# Patient Record
Sex: MALE | Race: WHITE | Employment: OTHER | URBAN - METROPOLITAN AREA
[De-identification: names, ages, dates, MRNs, and addresses within clinical notes are randomized per-mention and may not be internally consistent; named-entity substitution may affect disease eponyms.]

---

## 2020-01-09 NOTE — H&P (VIEW-ONLY)
Katy Artis 1935 ASSESSMENT: 
1. Left 5cm partial staghorn stone with intermittent left flank pain 2 Gross hematuria 3. S/p IVC placement 4. 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 Diabetes 6. Prior right renal procedure Proceed to L PCNL. Stop Eliquis 3 days prior. Will need to hold 7 days post op. Has IVC filter in place. PLAN: 
1. Continue with plan for PCNL 1/27/2020 
2. Will need to stop Eliquis 3 days prior and hold for 7 days. 3. Urine today sent for culture DISCUSSION: 
We discussed the indications and risks/benefits of PCNL including infection, bleeding, blood transfusion, post operative pain, injury to surrounding structures including bowel/liver/spleen/lung requiring open repair, injury to kidney requiring prolonged drainage and/or surgical repair. We discussed possible need for multiple procedures to clear stone burden. We also discussed global anesthesia and perioperative risks including stroke, heart attack, DVT, PE, and death. Also expressed need for an overnight stay at the hospital. The patient expresses understanding. Patient's BMI is out of the normal parameters. Information about BMI was given and patient was advised to follow-up with their PCP for further management. Chief Complaint Patient presents with  Kidney Stone Pt is here for Pre-Op consult. Pt denies any problems urinating such as burning, retention, weak streams. Pt appears to be in normal condition. HISTORY OF PRESENT ILLNESS: Katy Artis is a 81 y.o. male who presents today for follow up. Prev followed by Dr. Rhonda Dyer for a left partial staghorn stone. Reports today pre-op for PCNL 1/27/2020. No gross hematuria since his last visit. Previously reporting gross hematuria over the past 4 years and intermittent left flank pain especially when laying down flat over the past year.  Left flank pain has persisted since his last visit. Today he reports a good FOS with sensation of complete bladder emptying. No abdominal pain, f/c/n/v. No dysuria or urinary frequency. S/p negative TRUS bx in 2006. Right renal procedure in the past, unsure what the procedure was. REVIEW OF LABS & IMAGING: 
No results found for: PSA, Tika Arora, PSAR3, [id number], [id number], PSALT  
 
CT Urogram 12/10/2019: I personally reviewed the images. Left partial staghorn stone 1250 Hounsfield units Result Impression 1. Bilateral nonobstructing nephrolithiasis. Largest stone is seen at the level of the left renal pelvis extending into the mid and lower pole calyces, consistent with an evolving staghorn calculus. 2. Pancolonic diverticulosis. 3. Findings may suggest a diarrheal state. 4. Bibasilar volume loss and parenchymal fibrosis. 5. Prostatic hypertrophy. AUA Symptom Score 1/9/2020 Over the past month how often have you had the sensation that your bladder was not completely empty after you finished urinating? 0 Over the past month, how often have had to urinate again less than 2 hours after you last finished urinating? 0 Over the past month, how often have you found you stopped and started again several times when you urinated? 2 Over the past month, how often have you found it difficult to postpone urination? 0 Over the past month, how often have you had a weak urinary stream? 2 Over the past month, how often have you had to push or strain to begin urinating? 0 Over the past month, how many times did you most typically get up to urinate from the time you went to bed at night until the time you got up in the morning? 1  
AUA Score 5 If you were to spend the rest of your life with your urinary condition the way it is now, how would you feel about that? Mostly satisfied Review of Systems Constitutional: Fever: No 
Skin: Rash: No 
HEENT: Hearing difficulty: No 
Eyes: Blurred vision: No 
 Cardiovascular: Chest pain: No 
Respiratory: Shortness of breath: No 
Gastrointestinal: Nausea/vomiting: No 
Musculoskeletal: Back pain: No 
Neurological: Weakness: No 
Psychological: Memory loss: No 
Comments/additional findings:  
 
 
 
Past Medical History:  
Diagnosis Date  Anticoagulated  Basal cell carcinoma 2012 Left ear  Diabetes type 2, controlled ([facility] [address]  Hematuria 2011  
 worked up  Hyperlipidemia  Hypertension  Prostate enlargement 2005  Protein C deficiency ([facility] [address]  Thrombophlebitis of left leg ([facility]) Past Surgical History:  
Procedure Laterality Date  HX KNEE ARTHROSCOPY    
 left 3x  HX OTHER SURGICAL    
 ankle surgery-right  HX SHOULDER ARTHROSCOPY    
 left  HX UROLOGICAL  2006  
 prostate biopsy Allergies Allergen Reactions  Contrast Agent [Iodine] Rash  Penicillins Itching Current Outpatient Medications Medication Sig  
 apixaban (ELIQUIS) 5 mg tablet Take 0.5 Tabs by mouth.  lisinopril (PRINIVIL, ZESTRIL) 10 mg tablet Take 1 Tab by mouth.  metFORMIN (GLUCOPHAGE) 500 mg tablet Take 1 Tab by mouth.  simvastatin (ZOCOR) 20 mg tablet Take 1 Tab by mouth.  empagliflozin (JARDIANCE) 10 mg tablet Take  by mouth daily. No current facility-administered medications for this visit. Family History Problem Relation Age of Onset  Hypertension Mother  Other Mother   
     sudden death ; smoker  Hypertension Father  Elevated Lipids Father  Other Father   
     smoker  Heart Disease Sister   
     myocardial infarction  COPD Sister  Stomach Cancer Brother  Other Brother   
     peptic ulcer Social History Socioeconomic History  Marital status:  Spouse name: Not on file  Number of children: Not on file  Years of education: Not on file  Highest education level: Not on file Tobacco Use  Smoking status: Never Smoker  Smokeless tobacco: Never Used Substance and Sexual Activity  Alcohol use: Not Currently PHYSICAL EXAMINATION:  
Visit Vitals /64 Ht 5' 8\" (1.727 m) Wt 183 lb (83 kg) BMI 27.83 kg/m² Constitutional: WDWN, Pleasant and appropriate affect, No acute distress. CV:  No peripheral swelling noted Respiratory: No respiratory distress or difficulties Abdomen:  No abdominal masses or tenderness. No CVA tenderness. No inguinal hernias noted.  Male 12/18/19:   
Meriel Drought normal to visual inspection, no erythema or irritation, Sphincter with good tone, Rectum with a small external hemorrhoid at the 6 o'clock position. No fissures or masses, Prostate smooth, symmetric and anodular. Prostate is small. SCROTUM:  No scrotal rash or lesions noticed. Normal bilateral testes and epididymis. PENIS: Uncircumcised. Urethral meatus normal in location and size. No urethral discharge. Skin: No jaundice. Neuro/Psych:  Alert and oriented x 3, affect appropriate. Lymphatic:   No enlarged inguinal lymph nodes. LABS TODAY: 
Recent Results (from the past 12 hour(s)) AMB POC URINALYSIS DIP STICK AUTO W/O MICRO Collection Time: 01/09/20  9:15 AM  
Result Value Ref Range Color (UA POC) Yellow Clarity (UA POC) Cloudy Glucose (UA POC) Negative Negative Bilirubin (UA POC) Negative Negative Ketones (UA POC) Negative Negative Specific gravity (UA POC) 1.020 1.001 - 1.035 Blood (UA POC) 4+ Negative pH (UA POC) 5.0 4.6 - 8.0 Protein (UA POC) 3+ Negative Urobilinogen (UA POC) 0.2 mg/dL 0.2 - 1 Nitrites (UA POC) Negative Negative Leukocyte esterase (UA POC) Negative Negative Rajeev Singer MD 
[facility] [phone] Encounter Diagnoses ICD-10-CM ICD-9-CM 1. Staghorn kidney stones N20.0 592.0  
2. Microscopic hematuria R31.29 599.72 3. Left flank pain R10.9 789.09 Medical documentation provided with the assistance of Laxmi Arevalo.  Ramonita Soliman medical scribe for Melody Carrington MD on 1/9/2020.

## 2020-01-13 ENCOUNTER — HOSPITAL ENCOUNTER (OUTPATIENT)
Dept: PREADMISSION TESTING | Age: 85
Discharge: HOME | End: 2020-01-13
Payer: MEDICARE

## 2020-01-13 DIAGNOSIS — Z01.818 PRE-OP EXAM: ICD-10-CM

## 2020-01-13 LAB
ABO + RH BLD: NORMAL
ANION GAP SERPL CALC-SCNC: 8 MMOL/L (ref 3–18)
APPEARANCE UR: CLEAR
APTT PPP: 45 SEC (ref 23–36.4)
ATRIAL RATE: 78 BPM
BACTERIA URNS QL MICRO: ABNORMAL /HPF
BILIRUB UR QL: NEGATIVE
BLOOD GROUP ANTIBODIES SERPL: NORMAL
BUN SERPL-MCNC: 17 MG/DL (ref 7–18)
BUN/CREAT SERPL: 16 (ref 12–20)
CALCIUM SERPL-MCNC: 9.1 MG/DL (ref 8.5–10.1)
CALCULATED P AXIS, ECG09: -5 DEGREES
CALCULATED R AXIS, ECG10: -5 DEGREES
CALCULATED T AXIS, ECG11: 18 DEGREES
CHLORIDE SERPL-SCNC: 110 MMOL/L (ref 100–111)
CO2 SERPL-SCNC: 27 MMOL/L (ref 21–32)
COLOR UR: YELLOW
CREAT SERPL-MCNC: 1.08 MG/DL (ref 0.6–1.3)
DIAGNOSIS, 93000: NORMAL
EPITH CASTS URNS QL MICRO: ABNORMAL /LPF (ref 0–5)
ERYTHROCYTE [DISTWIDTH] IN BLOOD BY AUTOMATED COUNT: 13.3 % (ref 11.6–14.5)
GLUCOSE SERPL-MCNC: 104 MG/DL (ref 74–99)
GLUCOSE UR STRIP.AUTO-MCNC: >1000 MG/DL
HCT VFR BLD AUTO: 47 % (ref 36–48)
HGB BLD-MCNC: 15.8 G/DL (ref 13–16)
HGB UR QL STRIP: ABNORMAL
INR PPP: 1.1 (ref 0.8–1.2)
KETONES UR QL STRIP.AUTO: NEGATIVE MG/DL
LEUKOCYTE ESTERASE UR QL STRIP.AUTO: ABNORMAL
MCH RBC QN AUTO: 30.9 PG (ref 24–34)
MCHC RBC AUTO-ENTMCNC: 33.6 G/DL (ref 31–37)
MCV RBC AUTO: 92 FL (ref 74–97)
MUCOUS THREADS URNS QL MICRO: ABNORMAL /LPF
NITRITE UR QL STRIP.AUTO: NEGATIVE
P-R INTERVAL, ECG05: 174 MS
PH UR STRIP: 5 [PH] (ref 5–8)
PLATELET # BLD AUTO: 149 K/UL (ref 135–420)
PMV BLD AUTO: 11.1 FL (ref 9.2–11.8)
POTASSIUM SERPL-SCNC: 4.2 MMOL/L (ref 3.5–5.5)
PROT UR STRIP-MCNC: 30 MG/DL
PROTHROMBIN TIME: 13.8 SEC (ref 11.5–15.2)
Q-T INTERVAL, ECG07: 380 MS
QRS DURATION, ECG06: 84 MS
QTC CALCULATION (BEZET), ECG08: 433 MS
RBC # BLD AUTO: 5.11 M/UL (ref 4.7–5.5)
RBC #/AREA URNS HPF: ABNORMAL /HPF (ref 0–5)
SODIUM SERPL-SCNC: 145 MMOL/L (ref 136–145)
SP GR UR REFRACTOMETRY: 1.02 (ref 1–1.03)
SPECIMEN EXP DATE BLD: NORMAL
UROBILINOGEN UR QL STRIP.AUTO: 0.2 EU/DL (ref 0.2–1)
VENTRICULAR RATE, ECG03: 78 BPM
WBC # BLD AUTO: 6.4 K/UL (ref 4.6–13.2)
WBC URNS QL MICRO: ABNORMAL /HPF (ref 0–4)

## 2020-01-13 PROCEDURE — 80048 BASIC METABOLIC PNL TOTAL CA: CPT

## 2020-01-13 PROCEDURE — 85610 PROTHROMBIN TIME: CPT

## 2020-01-13 PROCEDURE — 81001 URINALYSIS AUTO W/SCOPE: CPT

## 2020-01-13 PROCEDURE — 93005 ELECTROCARDIOGRAM TRACING: CPT

## 2020-01-13 PROCEDURE — 85730 THROMBOPLASTIN TIME PARTIAL: CPT

## 2020-01-13 PROCEDURE — 85027 COMPLETE CBC AUTOMATED: CPT

## 2020-01-13 PROCEDURE — 36415 COLL VENOUS BLD VENIPUNCTURE: CPT

## 2020-01-13 PROCEDURE — 86900 BLOOD TYPING SEROLOGIC ABO: CPT

## 2020-01-13 PROCEDURE — 87086 URINE CULTURE/COLONY COUNT: CPT

## 2020-01-14 LAB
BACTERIA SPEC CULT: NORMAL
SERVICE CMNT-IMP: NORMAL

## 2020-01-25 ENCOUNTER — ANESTHESIA EVENT (OUTPATIENT)
Dept: SURGERY | Age: 85
DRG: 660 | End: 2020-01-25
Payer: MEDICARE

## 2020-01-27 ENCOUNTER — APPOINTMENT (OUTPATIENT)
Dept: GENERAL RADIOLOGY | Age: 85
DRG: 660 | End: 2020-01-27
Attending: UROLOGY
Payer: MEDICARE

## 2020-01-27 ENCOUNTER — HOSPITAL ENCOUNTER (INPATIENT)
Age: 85
LOS: 1 days | Discharge: HOME | DRG: 660 | End: 2020-01-28
Attending: UROLOGY | Admitting: UROLOGY
Payer: MEDICARE

## 2020-01-27 ENCOUNTER — ANESTHESIA (OUTPATIENT)
Dept: SURGERY | Age: 85
DRG: 660 | End: 2020-01-27
Payer: MEDICARE

## 2020-01-27 DIAGNOSIS — N20.0 KIDNEY STONE ON LEFT SIDE: Primary | ICD-10-CM

## 2020-01-27 LAB
GLUCOSE BLD STRIP.AUTO-MCNC: 110 MG/DL (ref 70–110)
GLUCOSE BLD STRIP.AUTO-MCNC: 137 MG/DL (ref 70–110)
GLUCOSE BLD STRIP.AUTO-MCNC: 151 MG/DL (ref 70–110)
GLUCOSE BLD STRIP.AUTO-MCNC: 163 MG/DL (ref 70–110)

## 2020-01-27 PROCEDURE — 77030008683 HC TU ET CUF COVD -A: Performed by: ANESTHESIOLOGY

## 2020-01-27 PROCEDURE — 77030019927 HC TBNG IRR CYSTO BAXT -A: Performed by: UROLOGY

## 2020-01-27 PROCEDURE — 65270000029 HC RM PRIVATE

## 2020-01-27 PROCEDURE — 77030038846 HC SCPE URETSCP LITHOVUE DISP BSC -H: Performed by: UROLOGY

## 2020-01-27 PROCEDURE — 74011250636 HC RX REV CODE- 250/636: Performed by: UROLOGY

## 2020-01-27 PROCEDURE — 74011250636 HC RX REV CODE- 250/636: Performed by: NURSE ANESTHETIST, CERTIFIED REGISTERED

## 2020-01-27 PROCEDURE — 77030019908 HC STETH ESOPH SIMS -A: Performed by: ANESTHESIOLOGY

## 2020-01-27 PROCEDURE — C1758 CATHETER, URETERAL: HCPCS | Performed by: UROLOGY

## 2020-01-27 PROCEDURE — 74011250637 HC RX REV CODE- 250/637: Performed by: UROLOGY

## 2020-01-27 PROCEDURE — 74011000250 HC RX REV CODE- 250: Performed by: NURSE ANESTHETIST, CERTIFIED REGISTERED

## 2020-01-27 PROCEDURE — C1894 INTRO/SHEATH, NON-LASER: HCPCS | Performed by: UROLOGY

## 2020-01-27 PROCEDURE — 76010000171 HC OR TIME 2 TO 2.5 HR INTENSV-TIER 1: Performed by: UROLOGY

## 2020-01-27 PROCEDURE — 74011636637 HC RX REV CODE- 636/637: Performed by: NURSE ANESTHETIST, CERTIFIED REGISTERED

## 2020-01-27 PROCEDURE — 82360 CALCULUS ASSAY QUANT: CPT

## 2020-01-27 PROCEDURE — 77030026438 HC STYL ET INTUB CARD -A: Performed by: ANESTHESIOLOGY

## 2020-01-27 PROCEDURE — 74011250637 HC RX REV CODE- 250/637: Performed by: NURSE ANESTHETIST, CERTIFIED REGISTERED

## 2020-01-27 PROCEDURE — 77030018836 HC SOL IRR NACL ICUM -A: Performed by: UROLOGY

## 2020-01-27 PROCEDURE — 0T778DZ DILATION OF LEFT URETER WITH INTRALUMINAL DEVICE, VIA NATURAL OR ARTIFICIAL OPENING ENDOSCOPIC: ICD-10-PCS | Performed by: UROLOGY

## 2020-01-27 PROCEDURE — 82962 GLUCOSE BLOOD TEST: CPT

## 2020-01-27 PROCEDURE — C2617 STENT, NON-COR, TEM W/O DEL: HCPCS | Performed by: UROLOGY

## 2020-01-27 PROCEDURE — 77030012961 HC IRR KT CYSTO/TUR ICUM -A: Performed by: UROLOGY

## 2020-01-27 PROCEDURE — 76210000006 HC OR PH I REC 0.5 TO 1 HR: Performed by: UROLOGY

## 2020-01-27 PROCEDURE — 77030030430 HC EXTRCT STN COOK -C: Performed by: UROLOGY

## 2020-01-27 PROCEDURE — 76060000035 HC ANESTHESIA 2 TO 2.5 HR: Performed by: UROLOGY

## 2020-01-27 PROCEDURE — 74011000250 HC RX REV CODE- 250: Performed by: UROLOGY

## 2020-01-27 PROCEDURE — 77030034696 HC CATH URETH FOL 2W BARD -A: Performed by: UROLOGY

## 2020-01-27 PROCEDURE — 77030039266 HC ADH SKN EXOFIN S2SG -A: Performed by: UROLOGY

## 2020-01-27 PROCEDURE — C1769 GUIDE WIRE: HCPCS | Performed by: UROLOGY

## 2020-01-27 PROCEDURE — 87070 CULTURE OTHR SPECIMN AEROBIC: CPT

## 2020-01-27 PROCEDURE — 74011636320 HC RX REV CODE- 636/320: Performed by: UROLOGY

## 2020-01-27 PROCEDURE — 71045 X-RAY EXAM CHEST 1 VIEW: CPT

## 2020-01-27 PROCEDURE — 74011636637 HC RX REV CODE- 636/637: Performed by: UROLOGY

## 2020-01-27 PROCEDURE — 0TC34ZZ EXTIRPATION OF MATTER FROM RIGHT KIDNEY PELVIS, PERCUTANEOUS ENDOSCOPIC APPROACH: ICD-10-PCS | Performed by: UROLOGY

## 2020-01-27 PROCEDURE — 74011000258 HC RX REV CODE- 258: Performed by: UROLOGY

## 2020-01-27 PROCEDURE — 77030040831 HC BAG URINE DRNG MDII -A: Performed by: UROLOGY

## 2020-01-27 PROCEDURE — 77030040817 HC PRB KT TRIL LITHO BSC -G: Performed by: UROLOGY

## 2020-01-27 PROCEDURE — 74425 UROGRAPHY ANTEGRADE RS&I: CPT

## 2020-01-27 PROCEDURE — C1726 CATH, BAL DIL, NON-VASCULAR: HCPCS | Performed by: UROLOGY

## 2020-01-27 PROCEDURE — 87075 CULTR BACTERIA EXCEPT BLOOD: CPT

## 2020-01-27 DEVICE — INLAY OPTIMA URETERAL STENT W/O GUIDEWIRE
Type: IMPLANTABLE DEVICE | Site: URETER | Status: FUNCTIONAL
Brand: BARD® INLAY OPTIMA® URETERAL STENT

## 2020-01-27 RX ORDER — MIDAZOLAM HYDROCHLORIDE 1 MG/ML
INJECTION, SOLUTION INTRAMUSCULAR; INTRAVENOUS AS NEEDED
Status: DISCONTINUED | OUTPATIENT
Start: 2020-01-27 | End: 2020-01-27 | Stop reason: HOSPADM

## 2020-01-27 RX ORDER — OXYCODONE AND ACETAMINOPHEN 5; 325 MG/1; MG/1
1 TABLET ORAL
Qty: 18 TAB | Refills: 0 | Status: SHIPPED | OUTPATIENT
Start: 2020-01-27 | End: 2020-02-01

## 2020-01-27 RX ORDER — MAGNESIUM SULFATE 100 %
4 CRYSTALS MISCELLANEOUS AS NEEDED
Status: DISCONTINUED | OUTPATIENT
Start: 2020-01-27 | End: 2020-01-27 | Stop reason: HOSPADM

## 2020-01-27 RX ORDER — SUCCINYLCHOLINE CHLORIDE 20 MG/ML
INJECTION INTRAMUSCULAR; INTRAVENOUS AS NEEDED
Status: DISCONTINUED | OUTPATIENT
Start: 2020-01-27 | End: 2020-01-27 | Stop reason: HOSPADM

## 2020-01-27 RX ORDER — MAGNESIUM SULFATE 100 %
16 CRYSTALS MISCELLANEOUS AS NEEDED
Status: DISCONTINUED | OUTPATIENT
Start: 2020-01-27 | End: 2020-01-28 | Stop reason: HOSPADM

## 2020-01-27 RX ORDER — DOCUSATE SODIUM 100 MG/1
100 CAPSULE, LIQUID FILLED ORAL 2 TIMES DAILY
Status: DISCONTINUED | OUTPATIENT
Start: 2020-01-27 | End: 2020-01-28 | Stop reason: HOSPADM

## 2020-01-27 RX ORDER — TAMSULOSIN HYDROCHLORIDE 0.4 MG/1
0.4 CAPSULE ORAL
Qty: 30 CAP | Refills: 0 | Status: SHIPPED | OUTPATIENT
Start: 2020-01-27

## 2020-01-27 RX ORDER — HYDROMORPHONE HYDROCHLORIDE 1 MG/ML
0.5 INJECTION, SOLUTION INTRAMUSCULAR; INTRAVENOUS; SUBCUTANEOUS
Status: DISCONTINUED | OUTPATIENT
Start: 2020-01-27 | End: 2020-01-28 | Stop reason: HOSPADM

## 2020-01-27 RX ORDER — INSULIN LISPRO 100 [IU]/ML
INJECTION, SOLUTION INTRAVENOUS; SUBCUTANEOUS
Status: DISCONTINUED | OUTPATIENT
Start: 2020-01-27 | End: 2020-01-28 | Stop reason: HOSPADM

## 2020-01-27 RX ORDER — HYDROMORPHONE HYDROCHLORIDE 2 MG/ML
0.5 INJECTION, SOLUTION INTRAMUSCULAR; INTRAVENOUS; SUBCUTANEOUS AS NEEDED
Status: DISCONTINUED | OUTPATIENT
Start: 2020-01-27 | End: 2020-01-27 | Stop reason: HOSPADM

## 2020-01-27 RX ORDER — FENTANYL CITRATE 50 UG/ML
INJECTION, SOLUTION INTRAMUSCULAR; INTRAVENOUS AS NEEDED
Status: DISCONTINUED | OUTPATIENT
Start: 2020-01-27 | End: 2020-01-27 | Stop reason: HOSPADM

## 2020-01-27 RX ORDER — SODIUM CHLORIDE 0.9 % (FLUSH) 0.9 %
5-40 SYRINGE (ML) INJECTION EVERY 8 HOURS
Status: DISCONTINUED | OUTPATIENT
Start: 2020-01-27 | End: 2020-01-27 | Stop reason: HOSPADM

## 2020-01-27 RX ORDER — DEXTROSE 50 % IN WATER (D50W) INTRAVENOUS SYRINGE
25-50 AS NEEDED
Status: DISCONTINUED | OUTPATIENT
Start: 2020-01-27 | End: 2020-01-27 | Stop reason: HOSPADM

## 2020-01-27 RX ORDER — ATROPA BELLADONNA AND OPIUM 16.2; 3 MG/1; MG/1
1 SUPPOSITORY RECTAL
Status: DISCONTINUED | OUTPATIENT
Start: 2020-01-27 | End: 2020-01-28 | Stop reason: HOSPADM

## 2020-01-27 RX ORDER — ONDANSETRON 2 MG/ML
4 INJECTION INTRAMUSCULAR; INTRAVENOUS ONCE
Status: DISCONTINUED | OUTPATIENT
Start: 2020-01-27 | End: 2020-01-27 | Stop reason: HOSPADM

## 2020-01-27 RX ORDER — BUPIVACAINE HYDROCHLORIDE 2.5 MG/ML
INJECTION, SOLUTION EPIDURAL; INFILTRATION; INTRACAUDAL AS NEEDED
Status: DISCONTINUED | OUTPATIENT
Start: 2020-01-27 | End: 2020-01-27 | Stop reason: HOSPADM

## 2020-01-27 RX ORDER — NEOSTIGMINE METHYLSULFATE 1 MG/ML
INJECTION, SOLUTION INTRAVENOUS AS NEEDED
Status: DISCONTINUED | OUTPATIENT
Start: 2020-01-27 | End: 2020-01-27 | Stop reason: HOSPADM

## 2020-01-27 RX ORDER — SODIUM CHLORIDE, SODIUM LACTATE, POTASSIUM CHLORIDE, CALCIUM CHLORIDE 600; 310; 30; 20 MG/100ML; MG/100ML; MG/100ML; MG/100ML
25 INJECTION, SOLUTION INTRAVENOUS CONTINUOUS
Status: DISCONTINUED | OUTPATIENT
Start: 2020-01-27 | End: 2020-01-27 | Stop reason: HOSPADM

## 2020-01-27 RX ORDER — ROCURONIUM BROMIDE 10 MG/ML
INJECTION, SOLUTION INTRAVENOUS AS NEEDED
Status: DISCONTINUED | OUTPATIENT
Start: 2020-01-27 | End: 2020-01-27 | Stop reason: HOSPADM

## 2020-01-27 RX ORDER — DEXTROSE MONOHYDRATE 100 MG/ML
125-250 INJECTION, SOLUTION INTRAVENOUS AS NEEDED
Status: DISCONTINUED | OUTPATIENT
Start: 2020-01-27 | End: 2020-01-28 | Stop reason: HOSPADM

## 2020-01-27 RX ORDER — LEVOFLOXACIN 5 MG/ML
500 INJECTION, SOLUTION INTRAVENOUS
Status: COMPLETED | OUTPATIENT
Start: 2020-01-27 | End: 2020-01-27

## 2020-01-27 RX ORDER — PROPOFOL 10 MG/ML
INJECTION, EMULSION INTRAVENOUS AS NEEDED
Status: DISCONTINUED | OUTPATIENT
Start: 2020-01-27 | End: 2020-01-27 | Stop reason: HOSPADM

## 2020-01-27 RX ORDER — TAMSULOSIN HYDROCHLORIDE 0.4 MG/1
0.4 CAPSULE ORAL DAILY
Status: DISCONTINUED | OUTPATIENT
Start: 2020-01-27 | End: 2020-01-28 | Stop reason: HOSPADM

## 2020-01-27 RX ORDER — SODIUM CHLORIDE 0.9 % (FLUSH) 0.9 %
5-40 SYRINGE (ML) INJECTION EVERY 8 HOURS
Status: DISCONTINUED | OUTPATIENT
Start: 2020-01-27 | End: 2020-01-28 | Stop reason: HOSPADM

## 2020-01-27 RX ORDER — NITROFURANTOIN 25; 75 MG/1; MG/1
100 CAPSULE ORAL 2 TIMES DAILY
Qty: 28 CAP | Refills: 0 | Status: SHIPPED | OUTPATIENT
Start: 2020-01-27 | End: 2020-02-10

## 2020-01-27 RX ORDER — FAMOTIDINE 20 MG/1
20 TABLET, FILM COATED ORAL ONCE
Status: COMPLETED | OUTPATIENT
Start: 2020-01-27 | End: 2020-01-27

## 2020-01-27 RX ORDER — INSULIN LISPRO 100 [IU]/ML
INJECTION, SOLUTION INTRAVENOUS; SUBCUTANEOUS ONCE
Status: COMPLETED | OUTPATIENT
Start: 2020-01-27 | End: 2020-01-27

## 2020-01-27 RX ORDER — SODIUM CHLORIDE, SODIUM LACTATE, POTASSIUM CHLORIDE, CALCIUM CHLORIDE 600; 310; 30; 20 MG/100ML; MG/100ML; MG/100ML; MG/100ML
100 INJECTION, SOLUTION INTRAVENOUS CONTINUOUS
Status: DISCONTINUED | OUTPATIENT
Start: 2020-01-27 | End: 2020-01-28 | Stop reason: HOSPADM

## 2020-01-27 RX ORDER — SODIUM CHLORIDE, SODIUM LACTATE, POTASSIUM CHLORIDE, CALCIUM CHLORIDE 600; 310; 30; 20 MG/100ML; MG/100ML; MG/100ML; MG/100ML
75 INJECTION, SOLUTION INTRAVENOUS CONTINUOUS
Status: DISCONTINUED | OUTPATIENT
Start: 2020-01-27 | End: 2020-01-27 | Stop reason: HOSPADM

## 2020-01-27 RX ORDER — SODIUM CHLORIDE 0.9 % (FLUSH) 0.9 %
5-40 SYRINGE (ML) INJECTION AS NEEDED
Status: DISCONTINUED | OUTPATIENT
Start: 2020-01-27 | End: 2020-01-27 | Stop reason: HOSPADM

## 2020-01-27 RX ORDER — OXYCODONE HYDROCHLORIDE 5 MG/1
5 TABLET ORAL
Status: DISCONTINUED | OUTPATIENT
Start: 2020-01-27 | End: 2020-01-28 | Stop reason: HOSPADM

## 2020-01-27 RX ORDER — ONDANSETRON 2 MG/ML
4 INJECTION INTRAMUSCULAR; INTRAVENOUS
Status: DISCONTINUED | OUTPATIENT
Start: 2020-01-27 | End: 2020-01-28 | Stop reason: HOSPADM

## 2020-01-27 RX ORDER — DOCUSATE SODIUM 100 MG/1
100 CAPSULE, LIQUID FILLED ORAL 2 TIMES DAILY
Qty: 60 CAP | Refills: 2 | Status: SHIPPED | OUTPATIENT
Start: 2020-01-27 | End: 2020-04-26

## 2020-01-27 RX ORDER — INSULIN LISPRO 100 [IU]/ML
INJECTION, SOLUTION INTRAVENOUS; SUBCUTANEOUS ONCE
Status: DISCONTINUED | OUTPATIENT
Start: 2020-01-27 | End: 2020-01-27 | Stop reason: HOSPADM

## 2020-01-27 RX ORDER — ONDANSETRON 2 MG/ML
INJECTION INTRAMUSCULAR; INTRAVENOUS AS NEEDED
Status: DISCONTINUED | OUTPATIENT
Start: 2020-01-27 | End: 2020-01-27 | Stop reason: HOSPADM

## 2020-01-27 RX ORDER — GLYCOPYRROLATE 0.2 MG/ML
INJECTION INTRAMUSCULAR; INTRAVENOUS AS NEEDED
Status: DISCONTINUED | OUTPATIENT
Start: 2020-01-27 | End: 2020-01-27 | Stop reason: HOSPADM

## 2020-01-27 RX ORDER — SIMVASTATIN 20 MG/1
20 TABLET, FILM COATED ORAL
Status: DISCONTINUED | OUTPATIENT
Start: 2020-01-27 | End: 2020-01-28 | Stop reason: HOSPADM

## 2020-01-27 RX ORDER — ACETAMINOPHEN 500 MG
1000 TABLET ORAL EVERY 6 HOURS
Status: DISCONTINUED | OUTPATIENT
Start: 2020-01-27 | End: 2020-01-28 | Stop reason: HOSPADM

## 2020-01-27 RX ORDER — SODIUM CHLORIDE 0.9 % (FLUSH) 0.9 %
5-40 SYRINGE (ML) INJECTION AS NEEDED
Status: DISCONTINUED | OUTPATIENT
Start: 2020-01-27 | End: 2020-01-28 | Stop reason: HOSPADM

## 2020-01-27 RX ORDER — DEXTROSE 50 % IN WATER (D50W) INTRAVENOUS SYRINGE
25-50 AS NEEDED
Status: DISCONTINUED | OUTPATIENT
Start: 2020-01-27 | End: 2020-01-27

## 2020-01-27 RX ORDER — LIDOCAINE HYDROCHLORIDE 20 MG/ML
INJECTION, SOLUTION EPIDURAL; INFILTRATION; INTRACAUDAL; PERINEURAL AS NEEDED
Status: DISCONTINUED | OUTPATIENT
Start: 2020-01-27 | End: 2020-01-27 | Stop reason: HOSPADM

## 2020-01-27 RX ADMIN — ACETAMINOPHEN 1000 MG: 500 TABLET ORAL at 17:55

## 2020-01-27 RX ADMIN — ACETAMINOPHEN 1000 MG: 500 TABLET ORAL at 12:00

## 2020-01-27 RX ADMIN — ROCURONIUM BROMIDE 5 MG: 10 INJECTION, SOLUTION INTRAVENOUS at 09:05

## 2020-01-27 RX ADMIN — FENTANYL CITRATE 50 MCG: 50 INJECTION INTRAMUSCULAR; INTRAVENOUS at 08:05

## 2020-01-27 RX ADMIN — INSULIN LISPRO 2 UNITS: 100 INJECTION, SOLUTION INTRAVENOUS; SUBCUTANEOUS at 21:30

## 2020-01-27 RX ADMIN — FENTANYL CITRATE 50 MCG: 50 INJECTION INTRAMUSCULAR; INTRAVENOUS at 09:05

## 2020-01-27 RX ADMIN — ONDANSETRON 4 MG: 2 INJECTION INTRAMUSCULAR; INTRAVENOUS at 08:34

## 2020-01-27 RX ADMIN — SIMVASTATIN 20 MG: 20 TABLET, FILM COATED ORAL at 21:30

## 2020-01-27 RX ADMIN — FENTANYL CITRATE 50 MCG: 50 INJECTION INTRAMUSCULAR; INTRAVENOUS at 07:28

## 2020-01-27 RX ADMIN — SODIUM CHLORIDE, SODIUM LACTATE, POTASSIUM CHLORIDE, AND CALCIUM CHLORIDE 100 ML/HR: 600; 310; 30; 20 INJECTION, SOLUTION INTRAVENOUS at 10:00

## 2020-01-27 RX ADMIN — SODIUM CHLORIDE, SODIUM LACTATE, POTASSIUM CHLORIDE, AND CALCIUM CHLORIDE 25 ML/HR: 600; 310; 30; 20 INJECTION, SOLUTION INTRAVENOUS at 07:10

## 2020-01-27 RX ADMIN — ROCURONIUM BROMIDE 5 MG: 10 INJECTION, SOLUTION INTRAVENOUS at 07:39

## 2020-01-27 RX ADMIN — SUCCINYLCHOLINE CHLORIDE 120 MG: 20 INJECTION, SOLUTION INTRAMUSCULAR; INTRAVENOUS at 07:39

## 2020-01-27 RX ADMIN — MIDAZOLAM HYDROCHLORIDE 0.5 MG: 2 INJECTION, SOLUTION INTRAMUSCULAR; INTRAVENOUS at 07:28

## 2020-01-27 RX ADMIN — DOCUSATE SODIUM 100 MG: 100 CAPSULE, LIQUID FILLED ORAL at 18:00

## 2020-01-27 RX ADMIN — LIDOCAINE HYDROCHLORIDE 80 MG: 20 INJECTION, SOLUTION EPIDURAL; INFILTRATION; INTRACAUDAL; PERINEURAL at 07:39

## 2020-01-27 RX ADMIN — INSULIN LISPRO 3 UNITS: 100 INJECTION, SOLUTION INTRAVENOUS; SUBCUTANEOUS at 10:09

## 2020-01-27 RX ADMIN — LEVOFLOXACIN 500 MG: 5 INJECTION, SOLUTION INTRAVENOUS at 07:28

## 2020-01-27 RX ADMIN — GENTAMICIN SULFATE 240 MG: 40 INJECTION, SOLUTION INTRAMUSCULAR; INTRAVENOUS at 07:53

## 2020-01-27 RX ADMIN — SODIUM CHLORIDE, SODIUM LACTATE, POTASSIUM CHLORIDE, AND CALCIUM CHLORIDE 100 ML/HR: 600; 310; 30; 20 INJECTION, SOLUTION INTRAVENOUS at 19:34

## 2020-01-27 RX ADMIN — TAMSULOSIN HYDROCHLORIDE 0.4 MG: 0.4 CAPSULE ORAL at 12:00

## 2020-01-27 RX ADMIN — Medication 3 MG: at 09:14

## 2020-01-27 RX ADMIN — DOCUSATE SODIUM 100 MG: 100 CAPSULE, LIQUID FILLED ORAL at 12:00

## 2020-01-27 RX ADMIN — SODIUM CHLORIDE, SODIUM LACTATE, POTASSIUM CHLORIDE, AND CALCIUM CHLORIDE 100 ML/HR: 600; 310; 30; 20 INJECTION, SOLUTION INTRAVENOUS at 21:34

## 2020-01-27 RX ADMIN — PROPOFOL 120 MG: 10 INJECTION, EMULSION INTRAVENOUS at 07:39

## 2020-01-27 RX ADMIN — FAMOTIDINE 20 MG: 20 TABLET, FILM COATED ORAL at 07:10

## 2020-01-27 RX ADMIN — GLYCOPYRROLATE 0.4 MG: 0.2 INJECTION INTRAMUSCULAR; INTRAVENOUS at 09:14

## 2020-01-27 NOTE — ANESTHESIA PREPROCEDURE EVALUATION
Relevant Problems   No relevant active problems       Anesthetic History   No history of anesthetic complications            Review of Systems / Medical History  Patient summary reviewed and pertinent labs reviewed    Pulmonary  Within defined limits                 Neuro/Psych   Within defined limits           Cardiovascular    Hypertension: well controlled              Exercise tolerance: >4 METS     GI/Hepatic/Renal                Endo/Other    Diabetes: well controlled, type 2         Other Findings   Comments: Protein C deficiency,with H/O DVT         Physical Exam    Airway  Mallampati: III  TM Distance: < 4 cm  Neck ROM: normal range of motion   Mouth opening: Normal     Cardiovascular  Regular rate and rhythm,  S1 and S2 normal,  no murmur, click, rub, or gallop             Dental  No notable dental hx       Pulmonary  Breath sounds clear to auscultation               Abdominal  GI exam deferred       Other Findings            Anesthetic Plan    ASA: 3  Anesthesia type: general          Induction: Intravenous  Anesthetic plan and risks discussed with: Patient

## 2020-01-27 NOTE — PROGRESS NOTES
TRANSFER - IN REPORT:    Verbal report received from [place] Alpers, RN on Katy Artis  being received from [facility] for routine progression of care      Report consisted of patients Situation, Background, Assessment and   Recommendations(SBAR). Information from the following report(s) SBAR was reviewed with the receiving nurse. Opportunity for questions and clarification was provided. Assessment completed upon patients arrival to unit and care assumed.

## 2020-01-27 NOTE — DISCHARGE INSTRUCTIONS
Discharge Instructions      Patient: Lillian Guzman               Sex: male          DOA: 1/27/2020         YOB: 1935      Age:  81 y.o.        LOS:  LOS: 0 days     ACUTE DIAGNOSES:  Nephrolithiasis     CHRONIC MEDICAL DIAGNOSES:  Problem List as of 1/27/2020 Never Reviewed          Codes Class Noted - Resolved    Kidney stone on left side ICD-10-CM: N20.0  ICD-9-CM: 592.0  1/27/2020 - Present        Staghorn calculus ICD-10-CM: N20.0  ICD-9-CM: 592.0  1/1/2011 - Present    Overview Addendum 12/17/2019  4:24 PM by Yamini Amaro MD     worked up  Cytology   11/19 atypixal , favor  Reactive     CT  12/19  Left Kidney: Evolving staghorn calculus at the level of the renal pelvis extending into the mid and lower pole calyces, measuring at least 3.6 x 2.1 cm. Additional nonobstructing stones are seen with the largest one measuring 6 mm. Cyst at the dorsal midpole measuring 1.6 cm. Smaller subcentimeter low-attenuation foci involving the left kidney, too small to accurately characterize.     Right Kidney: Multiple nonobstructing right renal stones, with the largest one measuring 7 mm at the midpole. Postsurgical changes involving the medial lower pole. Small cyst measuring 9 mm at the lateral midpole. There are additional subcentimeter low-attenuation foci which are too small to accurately characterize, but statistically benign. No filling defect within the contrast opacified right renal pelvis or calyces.     Left Ureter: Contrast opacified portions appear unremarkable.     Right Ureter: Contrast opacified portions appear unremarkable. KUB  12/17/2019 Large triangular-shaped stone is again noted in the left renal pelvis, unchanged from the prior CT. This measures approximately 2.6 x 1.9 cm. Additional small stones noted within left lower pole calyx, unchanged as well.   Multiple small right renal calculi are noted, similar to the prior CT as well with the largest measuring about 4 mm.    ACTIVITY: No heavy lifting for 1 week or until urine is clear, whichever is longer. ADDITIONAL INFORMATION:   Please do not resume Eliquis until 5 days following the procedure (Saturday February 1). Once resuming Eliquis, please monitor the color of the urine, and if the urine becomes much bloodier after starting Eliquis, please continue to hold and contact the office. You have indwelling ureteral stents which frequently causes slight discomfort in the flank region and bladder spasms. You can take flomax as needed for flank discomfort or Ditropan for bladder spasms. The indwelling stent will need to be removed/exchanged as directed below. If the stent is left in place without appropriate follow-up, it may become encrusted with stone and/or infected. If that occurs, you will require multiple additional surgeries to fix this. It is very important you follow up for appropriate removal or exchange of ureteral stents. If you experience any fevers > 100.4, significant nausea/vomiting, or significant worsening of pain, please contact us at the number below. It is common to experience mild, recurrent blood in your urine and this is likely due to stent irritation. The general trend should be decreasing bleeding with occasional flares due to increased activity. If the bleeding continues to worsen with passage of clots, you are unable to urinate, or you experience significant light-headedness, please contact us at the number below. If you develop new bleeding after a period of clear urine and this is severe, particularly if this is around 10 days after surgery, please contact our office immediately. If the bleeding is severe with clots or you feel lightheaded, please proceed immediately to the closest Emergency department. You may resume showering but do not directly scrub the incision. You may resume bathing in two week.   Please inspect your incision daily, looking for signs of infection (increasing/spreading redness, swelling, drainage). Keep the incision clean and dry. Should urine leakage persist beyond one week, please contact [facility]. FOLLOW UP CARE:  To be arranged with Dr. Ghazal Walker after review of CT.

## 2020-01-27 NOTE — PROGRESS NOTES
Reason for Admission:  Kidney stone [N20.0]  Kidney stone on left side [N20.0]                 RRAT Score:    7%            Plan for utilizing home health:    no                      Likelihood of Readmission:   LOW                         Transition of Care Plan: home             Initial assessment completed with patient. Cognitive status of patient: oriented to time, place, person and situation. Face sheet information confirmed:  yes. The patient designates wife, Cirilo Dueñas to participate in his discharge plan and to receive any needed information. This patient lives in a single family home with wife. Patient is able to navigate steps as needed. Prior to hospitalization, patient was considered to be independent with ADLs/IADLS : yes . Patient has a current ACP document on file: no  The wife will be available to transport patient home upon discharge. The patient already has Cane, BSC, raised toilet seat available in the home. Patient is not currently active with home health. Patient has not stayed in a skilled nursing facility or rehab. This patient is on dialysis :no    Freedom of choice signed: no. Currently, the discharge plan is Home. The patient states that he can obtain his medications from the pharmacy, and take his medications as directed. Patient's current insurance is Medicare/[employer]       Care Management Interventions  PCP Verified by CM:  Yes  Last Visit to PCP: 01/13/20  Mode of Transport at Discharge: Self  Transition of Care Consult (CM Consult): Discharge Planning  Physical Therapy Consult: No  Occupational Therapy Consult: No  Speech Therapy Consult: No  Current Support Network: Lives with Spouse  Confirm Follow Up Transport: Family  The Plan for Transition of Care is Related to the Following Treatment Goals : Home  Discharge Location  Discharge Placement: Home        Nomi Swan RN - Outcomes Manager  [phone]

## 2020-01-27 NOTE — PROGRESS NOTES
conducted an initial consultation and Spiritual Assessment for Katy Artis, who is a 81 y. o.,male. Patients Primary Language is: [place]. According to the patients EMR [employer] Affiliation is: [place]. The reason the Patient came to the hospital is:   Patient Active Problem List    Diagnosis Date Noted    Kidney stone on left side 01/27/2020    Staghorn calculus 01/01/2011        The  provided the following Interventions:  Initiated a relationship of care and support. Listened empathically. Provided information about Spiritual Care Services. Chart reviewed. The following outcomes where achieved:  Patient expressed gratitude for 's visit. Assessment:  Patient does not have any Hindu/cultural needs that will affect patients preferences in health care. There are no spiritual or Hindu issues which require intervention at this time. Plan:  Chaplains will continue to follow and will provide pastoral care on an as needed/requested basis.  recommends bedside caregivers page  on duty if patient shows signs of acute spiritual or emotional distress.     [address]  [phone])

## 2020-01-27 NOTE — OP NOTES
Operative Note  Patient: Cherylin Homans               Sex: male             MRN: [id number]  YOB: 1935      Age:  81 y.o. Preoperative Diagnosis: Kidney stone [N20.0]    Postoperative Diagnosis:  Kidney stone [N20.0]    Surgeon: Apolonia Hubbard MD    Fellow:  Gurpreet Maier MD    Indication: This is an 81year old  male with a history of 5 cm left partial staghorn renal calculus. After discussion of all management options, he elects to proceed with left PCNL. Procedure:    1) Cystoscopy  2) Left Flexible ureteroscopy    3) Surgeon acquired percutaneous left renal access   4) Percutaneous nephrolithotomy > 2 cm  5) Left ureteral stent placement     Findings:    1). Normal cystoscopy  2)  Ureteroscopy revealed ureter was clear, large staghorn renal calculus branching into the lower pole calyces  3). Single stick Percutaneous renal access obtained via upper pole calyx   4). Total stone burden 5 cm, visually completely cleared   5). 7F x 26 cm JJ stent placed    Narrative of events: The patient was brought to the operative suite. Anesthesia was induced and preoperative antibiotics were administered. The were prepped in split leg prone position with access to the urethra and to the flank. After appropriate pausea nd confirmation of antibiotic administration, cystoscopy was performed. Prostate was bilobar and mildly obstructing. Bladder was normal.  The left ureteral orifice was identified and cannulated with a 0.035 sensor wire. A duel lumen was advanced and retrogarde pyelogram performed revealing no hydronephrosis or filling defects in the ureter, large radioopaque stone seen in the renal pelvis branching into the lower pole infundibulum and calyces. Second wire was advanced and an access sheath was advanced to the proximal ureter. Flexible ureteroscopy was then performed.   This revealed large stone in the renal pelvis and lower pole calyces, nice posterior upper pole calyx with wide infundibulum and straight shot onto the stone. Bulls eye technique was utilized to enter the calyx with a single stick. This was observed both fluoroscopically and endoscopically. A Bentson wire was advanced through the flexible ureteroscope out of the needle and brought out of the urethra thus obtaining through and through access. A Tiger tail catheter was advanced over the wire all the way into the bladder and out the urethra to then exchange the wire for a super stiff. The balloon was advanced over the super stiff and confirmed just at the point of entry into the calyx endoscopically. The balloon was inflated and a 30 fr sheath was advanced over the balloon which was removed and rigid nephroscope was advanced. The ultrasonic lithotriptor was used to break up all the stone and remove it. Perc N Lyme basket used to retrieve the remaining fragments. Jacki Kirby was sent for analysis and culture. Flexible nephroscopy was then performed and a couple of residual fragments were basketed and cleared from the lower pole. Retrograde flexible ureteroscopy was then performed to clear the ureter. A 7F x 26 cm JJ stent was then advanced over the wire and deployed under direct fluoroscopic guidance in the renal pelvis and in the bladder. The sheath was removed and there was minimal bleeding from the puncture site. Remaining guidewire removed confirming the stent was in good position. Subcutaneous tissues were closed with interrupted 3-0 Vicryl and the skin was closed with a 4-0 Monocryl. 10 cc of Marcaine was infiltrated subcostally and in the skin. Patient was awoken from anesthesia and tolerated the procedure well. He was transferred to the recovery room in stable condition. Dr. Erica Ruiz was present, participated in and supervised this entire procedure.     Estimated Blood Loss: 50 CC     Anesthesia:  General                  Implants: 7F x 26 cm JJ stent left kidney    Specimens:   ID Type Source Tests Collected by Time Destination   1 : LEFT KIDNEY STONES FOR ANALYSIS Other STONES  Vishnu Oaklye MD 1/27/2020  8:46 AM Pathology   1 : LEFT KIDNEY STONE FOR CULTURE Tissue  CULTURE, ANAEROBIC, CULTURE, TISSUE W Ken Luevano MD 1/27/2020  8:45 AM Microbiology        Complications:  None    Concepcion Moore. Lanie Lares MD  Fellow  [facility]    . I was present for the entirety of the procedure and scrubbed     Dr. Joey Tavarez

## 2020-01-27 NOTE — PERIOP NOTES
TRANSFER - OUT REPORT:    Verbal report given to [facility], RN on Kayla Rodriguez  being transferred to Room 510 5S for routine progression of care       Report consisted of patients Situation, Background, Assessment and   Recommendations(SBAR). Information from the following report(s) Procedure Summary was reviewed with the receiving nurse. Lines:   Peripheral IV 01/27/20 Posterior;Right Hand (Active)   Site Assessment Clean, dry, & intact 1/27/2020  9:35 AM   Phlebitis Assessment 0 1/27/2020  9:35 AM   Infiltration Assessment 0 1/27/2020  9:35 AM   Dressing Status Clean, dry, & intact 1/27/2020  9:35 AM   Dressing Type Transparent 1/27/2020  9:35 AM   Hub Color/Line Status Pink 1/27/2020  9:35 AM        Opportunity for questions and clarification was provided.       Patient transported with:  Transport personnel

## 2020-01-27 NOTE — INTERVAL H&P NOTE
H&P Update: Lillian Service was seen and examined. History and physical has been reviewed. The patient has been examined.  There have been no significant clinical changes since the completion of the originally dated History and Physical.

## 2020-01-27 NOTE — ANESTHESIA POSTPROCEDURE EVALUATION
Procedure(s):  LEFT PERCUTANEOUS NEPHROSTOMY TUBE PLACEMENT/NEPHROSTOLITHOTOMY/C-ARM/100W HOLMIUM. general    Anesthesia Post Evaluation      Multimodal analgesia: multimodal analgesia used between 6 hours prior to anesthesia start to PACU discharge  Patient location during evaluation: bedside  Patient participation: complete - patient participated  Level of consciousness: awake  Pain management: adequate  Airway patency: patent  Anesthetic complications: no  Cardiovascular status: stable  Respiratory status: acceptable  Hydration status: acceptable  Post anesthesia nausea and vomiting:  controlled      Vitals Value Taken Time   /60 1/27/2020 10:22 AM   Temp 36.5 °C (97.7 °F) 1/27/2020  9:35 AM   Pulse 63 1/27/2020 10:22 AM   Resp 23 1/27/2020 10:22 AM   SpO2 100 % 1/27/2020 10:22 AM   Vitals shown include unvalidated device data.

## 2020-01-27 NOTE — PROGRESS NOTES
Problem: Falls - Risk of  Goal: *Absence of Falls  Description  Document Gary Wilson Fall Risk and appropriate interventions in the flowsheet.   Outcome: Progressing Towards Goal  Note: Fall Risk Interventions:            Medication Interventions: Patient to call before getting OOB, Teach patient to arise slowly                   Problem: Patient Education: Go to Patient Education Activity  Goal: Patient/Family Education  Outcome: Progressing Towards Goal

## 2020-01-28 ENCOUNTER — APPOINTMENT (OUTPATIENT)
Dept: CT IMAGING | Age: 85
DRG: 660 | End: 2020-01-28
Attending: UROLOGY
Payer: MEDICARE

## 2020-01-28 VITALS
HEART RATE: 64 BPM | WEIGHT: 180 LBS | SYSTOLIC BLOOD PRESSURE: 111 MMHG | DIASTOLIC BLOOD PRESSURE: 61 MMHG | OXYGEN SATURATION: 96 % | RESPIRATION RATE: 18 BRPM | HEIGHT: 68 IN | BODY MASS INDEX: 27.28 KG/M2 | TEMPERATURE: 97 F

## 2020-01-28 PROBLEM — N20.0 NEPHROLITHIASIS: Status: ACTIVE | Noted: 2020-01-28

## 2020-01-28 LAB
ANION GAP SERPL CALC-SCNC: 5 MMOL/L (ref 3–18)
BASOPHILS # BLD: 0 K/UL (ref 0–0.1)
BASOPHILS NFR BLD: 0 % (ref 0–2)
BUN SERPL-MCNC: 16 MG/DL (ref 7–18)
BUN/CREAT SERPL: 12 (ref 12–20)
CALCIUM SERPL-MCNC: 8.5 MG/DL (ref 8.5–10.1)
CHLORIDE SERPL-SCNC: 109 MMOL/L (ref 100–111)
CO2 SERPL-SCNC: 26 MMOL/L (ref 21–32)
CREAT SERPL-MCNC: 1.33 MG/DL (ref 0.6–1.3)
DIFFERENTIAL METHOD BLD: ABNORMAL
EOSINOPHIL # BLD: 0 K/UL (ref 0–0.4)
EOSINOPHIL NFR BLD: 1 % (ref 0–5)
ERYTHROCYTE [DISTWIDTH] IN BLOOD BY AUTOMATED COUNT: 13.6 % (ref 11.6–14.5)
GLUCOSE BLD STRIP.AUTO-MCNC: 118 MG/DL (ref 70–110)
GLUCOSE BLD STRIP.AUTO-MCNC: 156 MG/DL (ref 70–110)
GLUCOSE SERPL-MCNC: 119 MG/DL (ref 74–99)
HCT VFR BLD AUTO: 40.4 % (ref 36–48)
HGB BLD-MCNC: 13.3 G/DL (ref 13–16)
LYMPHOCYTES # BLD: 1.4 K/UL (ref 0.9–3.6)
LYMPHOCYTES NFR BLD: 18 % (ref 21–52)
MCH RBC QN AUTO: 30.7 PG (ref 24–34)
MCHC RBC AUTO-ENTMCNC: 32.9 G/DL (ref 31–37)
MCV RBC AUTO: 93.3 FL (ref 74–97)
MONOCYTES # BLD: 1 K/UL (ref 0.05–1.2)
MONOCYTES NFR BLD: 13 % (ref 3–10)
NEUTS SEG # BLD: 5.2 K/UL (ref 1.8–8)
NEUTS SEG NFR BLD: 68 % (ref 40–73)
PLATELET # BLD AUTO: 110 K/UL (ref 135–420)
PMV BLD AUTO: 11.2 FL (ref 9.2–11.8)
POTASSIUM SERPL-SCNC: 4.2 MMOL/L (ref 3.5–5.5)
RBC # BLD AUTO: 4.33 M/UL (ref 4.7–5.5)
SODIUM SERPL-SCNC: 140 MMOL/L (ref 136–145)
WBC # BLD AUTO: 7.6 K/UL (ref 4.6–13.2)

## 2020-01-28 PROCEDURE — 80048 BASIC METABOLIC PNL TOTAL CA: CPT

## 2020-01-28 PROCEDURE — 85025 COMPLETE CBC W/AUTO DIFF WBC: CPT

## 2020-01-28 PROCEDURE — 82962 GLUCOSE BLOOD TEST: CPT

## 2020-01-28 PROCEDURE — 74011250637 HC RX REV CODE- 250/637: Performed by: UROLOGY

## 2020-01-28 PROCEDURE — 36415 COLL VENOUS BLD VENIPUNCTURE: CPT

## 2020-01-28 PROCEDURE — 74176 CT ABD & PELVIS W/O CONTRAST: CPT

## 2020-01-28 PROCEDURE — 74011636637 HC RX REV CODE- 636/637: Performed by: UROLOGY

## 2020-01-28 PROCEDURE — 51798 US URINE CAPACITY MEASURE: CPT

## 2020-01-28 RX ADMIN — ACETAMINOPHEN 1000 MG: 500 TABLET ORAL at 05:44

## 2020-01-28 RX ADMIN — DOCUSATE SODIUM 100 MG: 100 CAPSULE, LIQUID FILLED ORAL at 08:35

## 2020-01-28 RX ADMIN — TAMSULOSIN HYDROCHLORIDE 0.4 MG: 0.4 CAPSULE ORAL at 08:35

## 2020-01-28 RX ADMIN — INSULIN LISPRO 2 UNITS: 100 INJECTION, SOLUTION INTRAVENOUS; SUBCUTANEOUS at 11:55

## 2020-01-28 NOTE — PROGRESS NOTES
Discharge noted for today, no needs, patient will d/c home with family support.          ZECHARIAH Moulton  Case Management  [phone]

## 2020-01-28 NOTE — PROGRESS NOTES
Patient armband removed and shredded  I have reviewed discharge instructions with the patient and spouse; copy provided. The patient and spouse verbalized understanding.

## 2020-01-28 NOTE — ROUTINE PROCESS
End of Shift Note Bedside and verbal shift change report given to GEOFFREY Brown (On coming nurse) by Kamala Erwin RN (Off going nurse). Report included the following information:  
   --Procedure Summary 
   --MAR, 
   --Recent Results --Med Rec Status SBAR Recommendations: D/C Issues for Provider to address D/C Activity This Shift 
 
 [] Bed Rest Order 
 [] Refused 
 [] Dangled  
 [] TDWB Ambulating: 
   [] Bathroom [] BSC [x] Room/Hallway Up in Chair for meals []Yes [] No  
Voiding       [] Yes  [] No 
Barnes          [x] Yes  [] No 
Incontinent [] Yes  [] No 
 
DUE TO VOID POUR        [] Yes [] No 
Purewick    [] Yes [] No 
New Onset [] Yes [] No Straight Cath   []Yes  [] No 
Condom Cath  [] Yes [] No 
MD Called      [] Yes  [] No  
Blood Sugars Managed [x]Yes [] No   
Bowels Moved [] Yes [x] No 
 
Incontinent     [] Yes [] No Passed Gas []Yes [x] No 
 
New Onset  []Yes [] No 
  
 
 MD Called []Yes  [] No 
  
CHG Bath Done Before Surgery After Surgery  
  
[] Yes  [x] No 
[] Yes  [x] No   
  
Drain Removed [] Yes  [] No [x] N/A Dressing Changed [] Yes   [x] No [] N/A Nausea/Vomiting [] Yes   [x] No    
Ice Packs Changed [] Yes   [x] No  [] N/A Incentive Spirometer  [x] Yes  [] No     
SCD Pumps On Ankle Pumping  [x] Yes   [] No  
  
[] Yes   [x] No    
  
Telemetry Monitoring [] Yes   [x] No   Rhythm

## 2020-01-28 NOTE — DISCHARGE SUMMARY
Discharge Summary     Patient ID:  Kayla Rodriguez  [id number]  50 y.o.  1935    Admit Date: 1/27/2020    Discharge Date: 1/28/2020    * Admission Diagnoses: Kidney stone [N20.0]; Kidney stone on left side [N20.0]    * Discharge Diagnoses:    Hospital Problems as of 1/28/2020 Never Reviewed          Codes Class Noted - Resolved POA    Kidney stone on left side ICD-10-CM: N20.0  ICD-9-CM: 592.0  1/27/2020 - Present Unknown               Admission Condition: Good    * Discharge Condition: good    * Procedures: Procedure(s):  LEFT PERCUTANEOUS NEPHROSTOMY TUBE PLACEMENT/NEPHROSTOLITHOTOMY/C-ARM/100W HOLMIUM    * Hospital Course:   Normal hospital course for this procedure. No pain. Tolerating diet. Ambulating well. Void trial in progress. PHYSICAL EXAMINATION:   Visit Vitals  /63 (BP Patient Position: At rest)   Pulse 70   Temp 98.4 °F (36.9 °C)   Resp 20   Ht 5' 8\" (1.727 m)   Wt 180 lb (81.6 kg)   SpO2 95%   BMI 27.37 kg/m²     Constitutional: WDWN, Pleasant and appropriate affect, No acute distress. CV:  No peripheral swelling noted  Respiratory: No respiratory distress or difficulties  Abdomen:  No abdominal masses or tenderness. No CVA tenderness. No inguinal hernias noted. : Flank incisions C/D/I. Barnes draining light pink urine. Skin: No jaundice. Neuro/Psych:  Alert and oriented x 3, affect appropriate. Lymphatic:   No enlarged inguinal lymph nodes.         Consults: None    Significant Diagnostic Studies:       Labs: Results:   Chemistry    Recent Labs     01/28/20  0450   *      K 4.2      CO2 26   BUN 16   CREA 1.33*   CA 8.5   AGAP 5   BUCR 12      CBC w/Diff Recent Labs     01/28/20  0450   WBC 7.6   RBC 4.33*   HGB 13.3   HCT 40.4   *   GRANS 68   LYMPH 18*   EOS 1      Cultures Recent Labs     01/27/20  0845   CULT PENDING     All Micro Results     Procedure Component Value Units Date/Time    CULTURE, TISSUE Rosio Crook STAIN [[id number]] Collected:  01/27/20 0845    Order Status:  Completed Specimen:  Kidney, Left Updated:  01/27/20 1427     Special Requests: STONE     GRAM STAIN NO ORGANISMS SEEN         NO WBC'S SEEN        Culture result: PENDING            Urinalysis Color   Date Value Ref Range Status   01/13/2020 YELLOW   Final     Appearance   Date Value Ref Range Status   01/13/2020 CLEAR   Final     Specific gravity   Date Value Ref Range Status   01/13/2020 1.019 1.005 - 1.030   Final     pH (UA)   Date Value Ref Range Status   01/13/2020 5.0 5.0 - 8.0   Final     Protein   Date Value Ref Range Status   01/13/2020 30 (A) NEG mg/dL Final     Ketone   Date Value Ref Range Status   01/13/2020 NEGATIVE  NEG mg/dL Final     Bilirubin   Date Value Ref Range Status   01/13/2020 NEGATIVE  NEG   Final     Blood   Date Value Ref Range Status   01/13/2020 LARGE (A) NEG   Final     Urobilinogen   Date Value Ref Range Status   01/13/2020 0.2 0.2 - 1.0 EU/dL Final     Nitrites   Date Value Ref Range Status   01/13/2020 NEGATIVE  NEG   Final     Leukocyte Esterase   Date Value Ref Range Status   01/13/2020 SMALL (A) NEG   Final     Potassium   Date Value Ref Range Status   01/28/2020 4.2 3.5 - 5.5 mmol/L Final     Creatinine   Date Value Ref Range Status   01/28/2020 1.33 (H) 0.6 - 1.3 MG/DL Final     BUN   Date Value Ref Range Status   01/28/2020 16 7.0 - 18 MG/DL Final      PSA No results for input(s): PSA in the last 72 hours. Coagulation Lab Results   Component Value Date/Time    Prothrombin time 13.8 01/13/2020 09:06 AM    Prothrombin time 13.9 10/09/2012 06:01 AM    INR 1.1 01/13/2020 09:06 AM    INR 1.1 10/09/2012 06:01 AM    aPTT 45.0 (H) 01/13/2020 09:06 AM           * Disposition: Home    Discharge Medications:   Current Discharge Medication List      START taking these medications    Details   oxyCODONE-acetaminophen (PERCOCET) 5-325 mg per tablet Take 1 Tab by mouth every six (6) hours as needed for Pain for up to 5 days.  Max Daily Amount: 4 Tabs. Qty: 18 Tab, Refills: 0    Associated Diagnoses: Kidney stone on left side      docusate sodium (COLACE) 100 mg capsule Take 1 Cap by mouth two (2) times a day for 90 days. Qty: 60 Cap, Refills: 2      tamsulosin (FLOMAX) 0.4 mg capsule Take 1 Cap by mouth daily (after dinner). Indications: stones in the urinary tract  Qty: 30 Cap, Refills: 0      !! nitrofurantoin, macrocrystal-monohydrate, (MACROBID) 100 mg capsule Take 1 Cap by mouth two (2) times a day for 14 days. Qty: 28 Cap, Refills: 0       !! - Potential duplicate medications found. Please discuss with provider. CONTINUE these medications which have NOT CHANGED    Details   !! nitrofurantoin, macrocrystal-monohydrate, (MACROBID) 100 mg capsule Take 1 Cap by mouth two (2) times a day. Start taking 5 days before procedure  Qty: 10 Cap, Refills: 0      apixaban (ELIQUIS) 5 mg tablet Take 2.5 mg by mouth two (2) times a day. lisinopril (PRINIVIL, ZESTRIL) 10 mg tablet Take 10 mg by mouth daily. metFORMIN (GLUCOPHAGE) 500 mg tablet Take 500 mg by mouth daily (with breakfast). simvastatin (ZOCOR) 20 mg tablet Take 1 Tab by mouth nightly. empagliflozin (JARDIANCE) 10 mg tablet Take  by mouth daily. !! - Potential duplicate medications found. Please discuss with provider. * Follow-up Care/Patient Instructions: Activity: See surgical instructions  Diet: Regular Diet  Wound Care: Keep wound clean and dry  Wound care discussed with patient. Follow-up Information     Follow up With Specialties Details Why Contact Info    Sharyn Brown, [address] [address]  [phone]          Follow-up tests/labs Start Blood thinners in 5 days and observe urine. PCP:  Eric Villalobos MD  Referring Provider: No ref. provider found    Follow up with:Sharyn Darden MD in 14 days. Follow up with:Dr. Norm Shepherd for stent removal on 2/17.       Signed:  Yohana Mckay Tayler Alanis MD    [phone]    January 28, 2020 10:49 AM

## 2020-01-28 NOTE — PHYSICIAN ADVISORY
I have reviewed this case as it involves a Medicare patient not meeting criteria for Inpatient status. The patient underwent elective surgery yesterday. The procedure is not on the current Medicare Inpatient Only List. The patient tolerated the procedure well, without any documented complications. There were no unexpected complications to warrant Observation services or Inpatient status. Routine postoperative recovery in outpatient status can be overnight. Therefore, Outpatient status (without Observation services) is appropriate. The final decision regarding the patient's hospitalization status depends on the attending physician's judgment. Ryanne Luong MD, MANUEL, FACP, [facility], CHCQM-PHYADV Physician Advisor Johanny [phone]

## 2020-01-30 LAB
BACTERIA SPEC CULT: NORMAL
GRAM STN SPEC: NORMAL
GRAM STN SPEC: NORMAL
SERVICE CMNT-IMP: NORMAL

## 2020-02-01 LAB
BACTERIA SPEC ANAEROBE CULT: NORMAL
SPECIMEN SOURCE: NORMAL

## 2020-02-06 LAB
BACTERIA SPEC CULT: NORMAL
SOURCE, RSRC56: NORMAL

## 2020-02-17 LAB
COLOR STONE: NORMAL
COM MFR STONE: 100 %
COMMENT, 519994: NORMAL
COMPOSITION, 120440: NORMAL
DISCLAIMER, STO32L: NORMAL
PHOTO, 120675: NORMAL
SIZE STONE: NORMAL MM
SPECIMEN SOURCE: NORMAL
WT STONE: 344.9 MG

## 2021-01-04 ENCOUNTER — TRANSCRIBE ORDER (OUTPATIENT)
Dept: REGISTRATION | Age: 86
End: 2021-01-04

## 2021-01-04 ENCOUNTER — HOSPITAL ENCOUNTER (OUTPATIENT)
Dept: PREADMISSION TESTING | Age: 86
Discharge: HOME | End: 2021-01-04
Payer: MEDICARE

## 2021-01-04 DIAGNOSIS — M16.11 PRIMARY OSTEOARTHRITIS OF RIGHT HIP: ICD-10-CM

## 2021-01-04 DIAGNOSIS — Z01.812 BLOOD TESTS PRIOR TO TREATMENT OR PROCEDURE: ICD-10-CM

## 2021-01-04 DIAGNOSIS — M16.11 PRIMARY OSTEOARTHRITIS OF RIGHT HIP: Primary | ICD-10-CM

## 2021-01-04 LAB
ALBUMIN SERPL-MCNC: 4 G/DL (ref 3.4–5)
ALBUMIN/GLOB SERPL: 1.2 {RATIO} (ref 0.8–1.7)
ALP SERPL-CCNC: 69 U/L (ref 45–117)
ALT SERPL-CCNC: 30 U/L (ref 16–61)
ANION GAP SERPL CALC-SCNC: 10 MMOL/L (ref 3–18)
APPEARANCE UR: CLEAR
APTT PPP: 47.4 SEC (ref 23–36.4)
AST SERPL-CCNC: 27 U/L (ref 10–38)
BACTERIA URNS QL MICRO: ABNORMAL /HPF
BASOPHILS # BLD: 0 K/UL (ref 0–0.1)
BASOPHILS NFR BLD: 0 % (ref 0–2)
BILIRUB SERPL-MCNC: 0.7 MG/DL (ref 0.2–1)
BILIRUB UR QL: NEGATIVE
BUN SERPL-MCNC: 22 MG/DL (ref 7–18)
BUN/CREAT SERPL: 18 (ref 12–20)
CALCIUM SERPL-MCNC: 9.5 MG/DL (ref 8.5–10.1)
CHLORIDE SERPL-SCNC: 104 MMOL/L (ref 100–111)
CO2 SERPL-SCNC: 26 MMOL/L (ref 21–32)
COLOR UR: YELLOW
CREAT SERPL-MCNC: 1.19 MG/DL (ref 0.6–1.3)
DIFFERENTIAL METHOD BLD: ABNORMAL
EOSINOPHIL # BLD: 0.1 K/UL (ref 0–0.4)
EOSINOPHIL NFR BLD: 1 % (ref 0–5)
EPITH CASTS URNS QL MICRO: ABNORMAL /LPF (ref 0–5)
ERYTHROCYTE [DISTWIDTH] IN BLOOD BY AUTOMATED COUNT: 13.2 % (ref 11.6–14.5)
ERYTHROCYTE [SEDIMENTATION RATE] IN BLOOD: 10 MM/HR (ref 0–20)
GLOBULIN SER CALC-MCNC: 3.4 G/DL (ref 2–4)
GLUCOSE SERPL-MCNC: 96 MG/DL (ref 74–99)
GLUCOSE UR STRIP.AUTO-MCNC: >1000 MG/DL
HBA1C MFR BLD: 6 % (ref 4.2–5.6)
HCT VFR BLD AUTO: 48.9 % (ref 36–48)
HGB BLD-MCNC: 16.6 G/DL (ref 13–16)
HGB UR QL STRIP: NEGATIVE
INR PPP: 1.2 (ref 0.8–1.2)
KETONES UR QL STRIP.AUTO: 80 MG/DL
LEUKOCYTE ESTERASE UR QL STRIP.AUTO: NEGATIVE
LYMPHOCYTES # BLD: 1.3 K/UL (ref 0.9–3.6)
LYMPHOCYTES NFR BLD: 20 % (ref 21–52)
MCH RBC QN AUTO: 32.5 PG (ref 24–34)
MCHC RBC AUTO-ENTMCNC: 33.9 G/DL (ref 31–37)
MCV RBC AUTO: 95.9 FL (ref 74–97)
MONOCYTES # BLD: 0.6 K/UL (ref 0.05–1.2)
MONOCYTES NFR BLD: 9 % (ref 3–10)
MUCOUS THREADS URNS QL MICRO: ABNORMAL /LPF
NEUTS SEG # BLD: 4.6 K/UL (ref 1.8–8)
NEUTS SEG NFR BLD: 70 % (ref 40–73)
NITRITE UR QL STRIP.AUTO: NEGATIVE
PH UR STRIP: 5 [PH] (ref 5–8)
PLATELET # BLD AUTO: 160 K/UL (ref 135–420)
PMV BLD AUTO: 10.4 FL (ref 9.2–11.8)
POTASSIUM SERPL-SCNC: 4.6 MMOL/L (ref 3.5–5.5)
PROT SERPL-MCNC: 7.4 G/DL (ref 6.4–8.2)
PROT UR STRIP-MCNC: ABNORMAL MG/DL
PROTHROMBIN TIME: 15.4 SEC (ref 11.5–15.2)
RBC # BLD AUTO: 5.1 M/UL (ref 4.7–5.5)
RBC #/AREA URNS HPF: ABNORMAL /HPF (ref 0–5)
SODIUM SERPL-SCNC: 140 MMOL/L (ref 136–145)
SP GR UR REFRACTOMETRY: >1.03 (ref 1–1.03)
UROBILINOGEN UR QL STRIP.AUTO: 1 EU/DL (ref 0.2–1)
WBC # BLD AUTO: 6.5 K/UL (ref 4.6–13.2)
WBC URNS QL MICRO: ABNORMAL /HPF (ref 0–5)

## 2021-01-04 PROCEDURE — 81001 URINALYSIS AUTO W/SCOPE: CPT

## 2021-01-04 PROCEDURE — 83036 HEMOGLOBIN GLYCOSYLATED A1C: CPT

## 2021-01-04 PROCEDURE — 85610 PROTHROMBIN TIME: CPT

## 2021-01-04 PROCEDURE — 85652 RBC SED RATE AUTOMATED: CPT

## 2021-01-04 PROCEDURE — 85025 COMPLETE CBC W/AUTO DIFF WBC: CPT

## 2021-01-04 PROCEDURE — 85730 THROMBOPLASTIN TIME PARTIAL: CPT

## 2021-01-04 PROCEDURE — 36415 COLL VENOUS BLD VENIPUNCTURE: CPT

## 2021-01-04 PROCEDURE — 80053 COMPREHEN METABOLIC PANEL: CPT

## 2021-01-04 PROCEDURE — 93005 ELECTROCARDIOGRAM TRACING: CPT

## 2021-01-05 LAB
ATRIAL RATE: 91 BPM
BACTERIA SPEC CULT: NORMAL
BACTERIA SPEC CULT: NORMAL
CALCULATED P AXIS, ECG09: 26 DEGREES
CALCULATED R AXIS, ECG10: 36 DEGREES
CALCULATED T AXIS, ECG11: 53 DEGREES
DIAGNOSIS, 93000: NORMAL
P-R INTERVAL, ECG05: 178 MS
Q-T INTERVAL, ECG07: 372 MS
QRS DURATION, ECG06: 80 MS
QTC CALCULATION (BEZET), ECG08: 457 MS
SERVICE CMNT-IMP: NORMAL
VENTRICULAR RATE, ECG03: 91 BPM

## 2021-01-12 ENCOUNTER — HOSPITAL ENCOUNTER (OUTPATIENT)
Dept: PREADMISSION TESTING | Age: 86
Discharge: HOME | End: 2021-01-12
Payer: MEDICARE

## 2021-01-12 PROCEDURE — 87635 SARS-COV-2 COVID-19 AMP PRB: CPT

## 2021-01-13 LAB — SARS-COV-2, COV2NT: NOT DETECTED

## 2021-01-17 ENCOUNTER — ANESTHESIA EVENT (OUTPATIENT)
Dept: SURGERY | Age: 86
End: 2021-01-17
Payer: MEDICARE

## 2021-01-17 PROBLEM — M16.11 OSTEOARTHRITIS OF RIGHT HIP: Chronic | Status: ACTIVE | Noted: 2021-01-17

## 2021-01-17 NOTE — H&P
[address] [facility] [address] Medicine  History and Physical Exam    Patient: Eber Hernandez MRN: [id number]  SSN: [id number]    YOB: 1935  Age: 81 y.o. Sex: male      Subjective:      Chief Complaint: right hip pain    History of Present Illness:  Patient complains of right hip pain and difficulty ambulating, which has progressed over the past several months. X-rays showed osteoarthritis of the joint. The patient's pain has persisted and progressed despite conservative treatments and therapies. The patient has been previously treated with medications/injections. The patient has at this time opted for surgical intervention. Past Medical History:   Diagnosis Date    Anticoagulated     Arthritis     Basal cell carcinoma 2012    Left ear    Diabetes type 2, controlled ([address]     Hematuria 2011    worked up   Laurel Carson Hyperlipidemia     Hypertension     Kidney stone 2019    Osteoarthritis of right hip 1/17/2021    Prostate enlargement 2005    Protein C deficiency ([address]     Thrombophlebitis of left leg ([address] 1999     Past Surgical History:   Procedure Laterality Date    HX KNEE ARTHROSCOPY Left 2017    left 3x    HX OTHER SURGICAL      ankle surgery-right    HX SHOULDER ARTHROSCOPY      left    HX URETEROLITHOTOMY  2021    kidney stone    HX UROLOGICAL  2006    prostate biopsy     IR IVC FILTER  2006    green filter     Social History     Occupational History    Not on file   Tobacco Use    Smoking status: Never Smoker    Smokeless tobacco: Never Used   Substance and Sexual Activity    Alcohol use: Not Currently    Drug use: Never    Sexual activity: Not on file     Prior to Admission medications    Medication Sig Start Date End Date Taking? Authorizing Provider   vit C/E/Zn/coppr/lutein/zeaxan (PRESERVISION AREDS-2 PO) Take  by mouth. Provider, Historical   ascorbic acid, vitamin C, (Vitamin C) 250 mg tablet Take 500 mg by mouth.     Provider, Historical acetaminophen (TylenoL) 325 mg tablet Take 325 mg by mouth every four (4) hours as needed for Pain. Provider, Historical   polyethylene glycol (MIRALAX) 17 gram/dose powder Take 17 g by mouth. 1/31/20   Provider, Historical   tamsulosin (FLOMAX) 0.4 mg capsule Take 1 Cap by mouth daily (after dinner). Indications: stones in the urinary tract 1/27/20   Yeimi Ellison MD   apixaban (ELIQUIS) 5 mg tablet Take 2.5 mg by mouth two (2) times a day. 7/14/19   Provider, Historical   lisinopril (PRINIVIL, ZESTRIL) 10 mg tablet Take 10 mg by mouth daily. 5/29/12   Provider, Historical   metFORMIN (GLUCOPHAGE) 500 mg tablet Take 500 mg by mouth daily (with breakfast). 5/29/12   Provider, Historical   simvastatin (ZOCOR) 20 mg tablet Take 1 Tab by mouth nightly. 11/30/16   Provider, Historical   empagliflozin (JARDIANCE) 10 mg tablet Take 10 mg by mouth daily. Provider, Historical       Allergies: Allergies   Allergen Reactions    Contrast Agent [Iodine] Rash    Penicillins Itching        Review of Systems:  Pertinent items are noted in the History of Present Illness. Objective:       Physical Exam:  HEENT: Normocephalic, atraumatic  Lungs:  Clear to auscultation  Heart:   Regular rate and rhythm  Abdomen: Soft  Extremities:  Pain with range of motion of the right hip. Passive flexion  degrees,                       passive internal rotation 0-10 degrees, with pain throughout ROM,                        passive external rotation 10-20 degrees with pain at the arc of motion. Antalgic gait noted. Assessment:      Arthritis of the right hip. Plan:       Proceed with scheduled RIGHT TOTAL HIP ARTHROPLASTY. The various methods of treatment have been discussed with the patient and family. After consideration of risks, benefits, and other options for treatment, the patient has consented to surgical interventions.  Questions were answered and preoperative teaching was done by  Loc Bates.      Signed By: ADINA Brock     January 17, 2021

## 2021-01-18 ENCOUNTER — APPOINTMENT (OUTPATIENT)
Dept: GENERAL RADIOLOGY | Age: 86
End: 2021-01-18
Attending: ORTHOPAEDIC SURGERY
Payer: MEDICARE

## 2021-01-18 ENCOUNTER — HOSPITAL ENCOUNTER (OUTPATIENT)
Age: 86
Discharge: HOME HEALTH | End: 2021-01-18
Attending: ORTHOPAEDIC SURGERY | Admitting: ORTHOPAEDIC SURGERY
Payer: MEDICARE

## 2021-01-18 ENCOUNTER — HOME HEALTH ADMISSION (OUTPATIENT)
Dept: HOME HEALTH SERVICES | Facility: HOME HEALTH | Age: 86
End: 2021-01-18
Payer: MEDICARE

## 2021-01-18 ENCOUNTER — APPOINTMENT (OUTPATIENT)
Dept: GENERAL RADIOLOGY | Age: 86
End: 2021-01-18
Attending: PHYSICIAN ASSISTANT
Payer: MEDICARE

## 2021-01-18 ENCOUNTER — ANESTHESIA (OUTPATIENT)
Dept: SURGERY | Age: 86
End: 2021-01-18
Payer: MEDICARE

## 2021-01-18 VITALS
WEIGHT: 178.19 LBS | HEIGHT: 68 IN | DIASTOLIC BLOOD PRESSURE: 62 MMHG | HEART RATE: 73 BPM | BODY MASS INDEX: 27.01 KG/M2 | RESPIRATION RATE: 16 BRPM | OXYGEN SATURATION: 100 % | SYSTOLIC BLOOD PRESSURE: 131 MMHG | TEMPERATURE: 98 F

## 2021-01-18 DIAGNOSIS — M16.11 PRIMARY OSTEOARTHRITIS OF RIGHT HIP: Primary | Chronic | ICD-10-CM

## 2021-01-18 LAB
ABO + RH BLD: NORMAL
BLOOD GROUP ANTIBODIES SERPL: NORMAL
GLUCOSE BLD STRIP.AUTO-MCNC: 114 MG/DL (ref 70–110)
GLUCOSE BLD STRIP.AUTO-MCNC: 164 MG/DL (ref 70–110)
SPECIMEN EXP DATE BLD: NORMAL

## 2021-01-18 PROCEDURE — 76010000149 HC OR TIME 1 TO 1.5 HR: Performed by: ORTHOPAEDIC SURGERY

## 2021-01-18 PROCEDURE — 74011250636 HC RX REV CODE- 250/636: Performed by: ORTHOPAEDIC SURGERY

## 2021-01-18 PROCEDURE — 97166 OT EVAL MOD COMPLEX 45 MIN: CPT

## 2021-01-18 PROCEDURE — 97535 SELF CARE MNGMENT TRAINING: CPT

## 2021-01-18 PROCEDURE — 97161 PT EVAL LOW COMPLEX 20 MIN: CPT

## 2021-01-18 PROCEDURE — 77030020782 HC GWN BAIR PAWS FLX 3M -B: Performed by: ORTHOPAEDIC SURGERY

## 2021-01-18 PROCEDURE — 73501 X-RAY EXAM HIP UNI 1 VIEW: CPT

## 2021-01-18 PROCEDURE — 82962 GLUCOSE BLOOD TEST: CPT

## 2021-01-18 PROCEDURE — 77030012508 HC MSK AIRWY LMA AMBU -A: Performed by: NURSE ANESTHETIST, CERTIFIED REGISTERED

## 2021-01-18 PROCEDURE — 74011250637 HC RX REV CODE- 250/637: Performed by: ANESTHESIOLOGY

## 2021-01-18 PROCEDURE — 77030031139 HC SUT VCRL2 J&J -A: Performed by: ORTHOPAEDIC SURGERY

## 2021-01-18 PROCEDURE — 77030003666 HC NDL SPINAL BD -A: Performed by: ORTHOPAEDIC SURGERY

## 2021-01-18 PROCEDURE — 76060000033 HC ANESTHESIA 1 TO 1.5 HR: Performed by: ORTHOPAEDIC SURGERY

## 2021-01-18 PROCEDURE — 74011250637 HC RX REV CODE- 250/637: Performed by: ORTHOPAEDIC SURGERY

## 2021-01-18 PROCEDURE — 77030020268 HC MISC GENERAL SUPPLY: Performed by: ORTHOPAEDIC SURGERY

## 2021-01-18 PROCEDURE — 74011000250 HC RX REV CODE- 250: Performed by: NURSE ANESTHETIST, CERTIFIED REGISTERED

## 2021-01-18 PROCEDURE — 74011636637 HC RX REV CODE- 636/637: Performed by: ANESTHESIOLOGY

## 2021-01-18 PROCEDURE — 74011250636 HC RX REV CODE- 250/636: Performed by: PHYSICIAN ASSISTANT

## 2021-01-18 PROCEDURE — 97116 GAIT TRAINING THERAPY: CPT

## 2021-01-18 PROCEDURE — C1776 JOINT DEVICE (IMPLANTABLE): HCPCS | Performed by: ORTHOPAEDIC SURGERY

## 2021-01-18 PROCEDURE — 76210000006 HC OR PH I REC 0.5 TO 1 HR: Performed by: ORTHOPAEDIC SURGERY

## 2021-01-18 PROCEDURE — 77030041075 HC DRSG AG OPTIFRM MDII -B: Performed by: ORTHOPAEDIC SURGERY

## 2021-01-18 PROCEDURE — 86901 BLOOD TYPING SEROLOGIC RH(D): CPT

## 2021-01-18 PROCEDURE — 74011000250 HC RX REV CODE- 250: Performed by: ORTHOPAEDIC SURGERY

## 2021-01-18 PROCEDURE — 77030038010: Performed by: ORTHOPAEDIC SURGERY

## 2021-01-18 PROCEDURE — 77030027138 HC INCENT SPIROMETER -A: Performed by: ORTHOPAEDIC SURGERY

## 2021-01-18 PROCEDURE — 36415 COLL VENOUS BLD VENIPUNCTURE: CPT

## 2021-01-18 PROCEDURE — 77030034694 HC SCPL CANADY PLSM DISP USMD -E: Performed by: ORTHOPAEDIC SURGERY

## 2021-01-18 PROCEDURE — 77030013708 HC HNDPC SUC IRR PULS STRY –B: Performed by: ORTHOPAEDIC SURGERY

## 2021-01-18 PROCEDURE — 77030037713 HC CLOSR DEV INCIS ZIP STRY -B: Performed by: ORTHOPAEDIC SURGERY

## 2021-01-18 PROCEDURE — 77030040361 HC SLV COMPR DVT MDII -B: Performed by: ORTHOPAEDIC SURGERY

## 2021-01-18 PROCEDURE — 2709999900 HC NON-CHARGEABLE SUPPLY: Performed by: ORTHOPAEDIC SURGERY

## 2021-01-18 PROCEDURE — 74011250637 HC RX REV CODE- 250/637: Performed by: PHYSICIAN ASSISTANT

## 2021-01-18 PROCEDURE — 74011250636 HC RX REV CODE- 250/636: Performed by: NURSE ANESTHETIST, CERTIFIED REGISTERED

## 2021-01-18 DEVICE — FEMORAL HEAD 36-12/14+0
Type: IMPLANTABLE DEVICE | Site: HIP | Status: FUNCTIONAL
Brand: DELTA CERAMIC FEMORAL HEAD

## 2021-01-18 DEVICE — ACETABULAR LINER NEUTRAL 36/52
Type: IMPLANTABLE DEVICE | Site: HIP | Status: FUNCTIONAL
Brand: LEGEND

## 2021-01-18 DEVICE — SIZE 10 EXT COLLAR
Type: IMPLANTABLE DEVICE | Site: HIP | Status: FUNCTIONAL
Brand: ENTRADA HIP STEM

## 2021-01-18 DEVICE — LOW PROFILE CANCELLOUS SCREW 30MM
Type: IMPLANTABLE DEVICE | Site: HIP | Status: FUNCTIONAL
Brand: ESCALADE ACETABULAR SYSTEM

## 2021-01-18 DEVICE — THREE HOLE, 52 MM
Type: IMPLANTABLE DEVICE | Site: HIP | Status: FUNCTIONAL
Brand: LEGEND ACETABULAR SHELL

## 2021-01-18 DEVICE — HIP H2 TOT ADV OTHER HD IMPL CAPPED H2 OD: Type: IMPLANTABLE DEVICE | Site: HIP | Status: FUNCTIONAL

## 2021-01-18 RX ORDER — SODIUM CHLORIDE 0.9 % (FLUSH) 0.9 %
5-40 SYRINGE (ML) INJECTION EVERY 8 HOURS
Status: DISCONTINUED | OUTPATIENT
Start: 2021-01-18 | End: 2021-01-18 | Stop reason: HOSPADM

## 2021-01-18 RX ORDER — TRANEXAMIC ACID 650 1/1
1950 TABLET ORAL ONCE
Status: COMPLETED | OUTPATIENT
Start: 2021-01-18 | End: 2021-01-18

## 2021-01-18 RX ORDER — CEFAZOLIN SODIUM/WATER 2 G/20 ML
2 SYRINGE (ML) INTRAVENOUS ONCE
Status: COMPLETED | OUTPATIENT
Start: 2021-01-18 | End: 2021-01-18

## 2021-01-18 RX ORDER — PANTOPRAZOLE SODIUM 40 MG/1
40 TABLET, DELAYED RELEASE ORAL DAILY
Status: DISCONTINUED | OUTPATIENT
Start: 2021-01-18 | End: 2021-01-18

## 2021-01-18 RX ORDER — ACETAMINOPHEN 500 MG
1000 TABLET ORAL ONCE
Status: COMPLETED | OUTPATIENT
Start: 2021-01-18 | End: 2021-01-18

## 2021-01-18 RX ORDER — SODIUM CHLORIDE, SODIUM LACTATE, POTASSIUM CHLORIDE, CALCIUM CHLORIDE 600; 310; 30; 20 MG/100ML; MG/100ML; MG/100ML; MG/100ML
125 INJECTION, SOLUTION INTRAVENOUS CONTINUOUS
Status: DISCONTINUED | OUTPATIENT
Start: 2021-01-18 | End: 2021-01-18 | Stop reason: HOSPADM

## 2021-01-18 RX ORDER — DEXAMETHASONE SODIUM PHOSPHATE 4 MG/ML
8 INJECTION, SOLUTION INTRA-ARTICULAR; INTRALESIONAL; INTRAMUSCULAR; INTRAVENOUS; SOFT TISSUE ONCE
Status: DISCONTINUED | OUTPATIENT
Start: 2021-01-18 | End: 2021-01-18

## 2021-01-18 RX ORDER — ONDANSETRON 2 MG/ML
4 INJECTION INTRAMUSCULAR; INTRAVENOUS
Status: DISCONTINUED | OUTPATIENT
Start: 2021-01-18 | End: 2021-01-18 | Stop reason: HOSPADM

## 2021-01-18 RX ORDER — DEXAMETHASONE SODIUM PHOSPHATE 4 MG/ML
4 INJECTION, SOLUTION INTRA-ARTICULAR; INTRALESIONAL; INTRAMUSCULAR; INTRAVENOUS; SOFT TISSUE ONCE
Status: COMPLETED | OUTPATIENT
Start: 2021-01-18 | End: 2021-01-18

## 2021-01-18 RX ORDER — ZOLPIDEM TARTRATE 5 MG/1
5-10 TABLET ORAL
Status: DISCONTINUED | OUTPATIENT
Start: 2021-01-18 | End: 2021-01-18 | Stop reason: HOSPADM

## 2021-01-18 RX ORDER — KETAMINE HYDROCHLORIDE 10 MG/ML
INJECTION, SOLUTION INTRAMUSCULAR; INTRAVENOUS AS NEEDED
Status: DISCONTINUED | OUTPATIENT
Start: 2021-01-18 | End: 2021-01-18 | Stop reason: HOSPADM

## 2021-01-18 RX ORDER — LIDOCAINE HYDROCHLORIDE 20 MG/ML
INJECTION, SOLUTION EPIDURAL; INFILTRATION; INTRACAUDAL; PERINEURAL AS NEEDED
Status: DISCONTINUED | OUTPATIENT
Start: 2021-01-18 | End: 2021-01-18 | Stop reason: HOSPADM

## 2021-01-18 RX ORDER — HYDROMORPHONE HYDROCHLORIDE 1 MG/ML
INJECTION, SOLUTION INTRAMUSCULAR; INTRAVENOUS; SUBCUTANEOUS AS NEEDED
Status: DISCONTINUED | OUTPATIENT
Start: 2021-01-18 | End: 2021-01-18 | Stop reason: HOSPADM

## 2021-01-18 RX ORDER — DOCUSATE SODIUM 100 MG/1
100 CAPSULE, LIQUID FILLED ORAL DAILY
Status: DISCONTINUED | OUTPATIENT
Start: 2021-01-18 | End: 2021-01-18 | Stop reason: HOSPADM

## 2021-01-18 RX ORDER — CELECOXIB 100 MG/1
200 CAPSULE ORAL
Status: COMPLETED | OUTPATIENT
Start: 2021-01-18 | End: 2021-01-18

## 2021-01-18 RX ORDER — MAGNESIUM SULFATE 100 %
4 CRYSTALS MISCELLANEOUS AS NEEDED
Status: DISCONTINUED | OUTPATIENT
Start: 2021-01-18 | End: 2021-01-18 | Stop reason: HOSPADM

## 2021-01-18 RX ORDER — DIPHENHYDRAMINE HYDROCHLORIDE 50 MG/ML
12.5 INJECTION, SOLUTION INTRAMUSCULAR; INTRAVENOUS
Status: DISCONTINUED | OUTPATIENT
Start: 2021-01-18 | End: 2021-01-18 | Stop reason: HOSPADM

## 2021-01-18 RX ORDER — OXYCODONE HYDROCHLORIDE 5 MG/1
5 TABLET ORAL AS NEEDED
Status: DISCONTINUED | OUTPATIENT
Start: 2021-01-18 | End: 2021-01-18 | Stop reason: HOSPADM

## 2021-01-18 RX ORDER — OXYCODONE HYDROCHLORIDE 5 MG/1
5-10 TABLET ORAL
Status: DISCONTINUED | OUTPATIENT
Start: 2021-01-18 | End: 2021-01-18 | Stop reason: HOSPADM

## 2021-01-18 RX ORDER — ONDANSETRON 2 MG/ML
INJECTION INTRAMUSCULAR; INTRAVENOUS AS NEEDED
Status: DISCONTINUED | OUTPATIENT
Start: 2021-01-18 | End: 2021-01-18 | Stop reason: HOSPADM

## 2021-01-18 RX ORDER — SODIUM CHLORIDE 9 MG/ML
300 INJECTION, SOLUTION INTRAVENOUS CONTINUOUS
Status: DISCONTINUED | OUTPATIENT
Start: 2021-01-18 | End: 2021-01-18 | Stop reason: HOSPADM

## 2021-01-18 RX ORDER — SODIUM CHLORIDE 0.9 % (FLUSH) 0.9 %
5-40 SYRINGE (ML) INJECTION AS NEEDED
Status: DISCONTINUED | OUTPATIENT
Start: 2021-01-18 | End: 2021-01-18 | Stop reason: HOSPADM

## 2021-01-18 RX ORDER — DEXMEDETOMIDINE HYDROCHLORIDE 100 UG/ML
INJECTION, SOLUTION INTRAVENOUS AS NEEDED
Status: DISCONTINUED | OUTPATIENT
Start: 2021-01-18 | End: 2021-01-18 | Stop reason: HOSPADM

## 2021-01-18 RX ORDER — HYDROMORPHONE HYDROCHLORIDE 2 MG/ML
0.2 INJECTION, SOLUTION INTRAMUSCULAR; INTRAVENOUS; SUBCUTANEOUS
Status: DISCONTINUED | OUTPATIENT
Start: 2021-01-18 | End: 2021-01-18 | Stop reason: HOSPADM

## 2021-01-18 RX ORDER — SODIUM CHLORIDE 9 MG/ML
125 INJECTION, SOLUTION INTRAVENOUS CONTINUOUS
Status: DISCONTINUED | OUTPATIENT
Start: 2021-01-18 | End: 2021-01-18 | Stop reason: HOSPADM

## 2021-01-18 RX ORDER — METOCLOPRAMIDE HYDROCHLORIDE 5 MG/ML
10 INJECTION INTRAMUSCULAR; INTRAVENOUS
Status: DISCONTINUED | OUTPATIENT
Start: 2021-01-18 | End: 2021-01-18 | Stop reason: HOSPADM

## 2021-01-18 RX ORDER — INSULIN LISPRO 100 [IU]/ML
INJECTION, SOLUTION INTRAVENOUS; SUBCUTANEOUS ONCE
Status: COMPLETED | OUTPATIENT
Start: 2021-01-18 | End: 2021-01-18

## 2021-01-18 RX ORDER — LANOLIN ALCOHOL/MO/W.PET/CERES
1 CREAM (GRAM) TOPICAL 3 TIMES DAILY
Status: DISCONTINUED | OUTPATIENT
Start: 2021-01-18 | End: 2021-01-18 | Stop reason: HOSPADM

## 2021-01-18 RX ORDER — PROPOFOL 10 MG/ML
INJECTION, EMULSION INTRAVENOUS AS NEEDED
Status: DISCONTINUED | OUTPATIENT
Start: 2021-01-18 | End: 2021-01-18 | Stop reason: HOSPADM

## 2021-01-18 RX ORDER — ACETAMINOPHEN 500 MG
1000 TABLET ORAL
Status: DISCONTINUED | OUTPATIENT
Start: 2021-01-18 | End: 2021-01-18

## 2021-01-18 RX ORDER — OXYCODONE HYDROCHLORIDE 5 MG/1
5 TABLET ORAL
Qty: 42 TAB | Refills: 0 | Status: SHIPPED | OUTPATIENT
Start: 2021-01-18 | End: 2021-01-25

## 2021-01-18 RX ORDER — ACETAMINOPHEN 325 MG/1
650 TABLET ORAL EVERY 6 HOURS
Status: DISCONTINUED | OUTPATIENT
Start: 2021-01-18 | End: 2021-01-18 | Stop reason: HOSPADM

## 2021-01-18 RX ORDER — CEFADROXIL 500 MG/1
500 CAPSULE ORAL 2 TIMES DAILY
Qty: 10 CAP | Refills: 0 | Status: SHIPPED | OUTPATIENT
Start: 2021-01-18 | End: 2021-01-23

## 2021-01-18 RX ORDER — PANTOPRAZOLE SODIUM 40 MG/1
40 TABLET, DELAYED RELEASE ORAL DAILY
Status: DISCONTINUED | OUTPATIENT
Start: 2021-01-18 | End: 2021-01-18 | Stop reason: HOSPADM

## 2021-01-18 RX ORDER — NALOXONE HYDROCHLORIDE 0.4 MG/ML
0.4 INJECTION, SOLUTION INTRAMUSCULAR; INTRAVENOUS; SUBCUTANEOUS AS NEEDED
Status: DISCONTINUED | OUTPATIENT
Start: 2021-01-18 | End: 2021-01-18 | Stop reason: HOSPADM

## 2021-01-18 RX ORDER — INSULIN LISPRO 100 [IU]/ML
INJECTION, SOLUTION INTRAVENOUS; SUBCUTANEOUS ONCE
Status: DISCONTINUED | OUTPATIENT
Start: 2021-01-18 | End: 2021-01-18 | Stop reason: HOSPADM

## 2021-01-18 RX ADMIN — PANTOPRAZOLE SODIUM 40 MG: 40 TABLET, DELAYED RELEASE ORAL at 07:42

## 2021-01-18 RX ADMIN — HYDROMORPHONE HYDROCHLORIDE 0.5 MG: 1 INJECTION, SOLUTION INTRAMUSCULAR; INTRAVENOUS; SUBCUTANEOUS at 09:27

## 2021-01-18 RX ADMIN — LIDOCAINE HYDROCHLORIDE 80 MG: 20 INJECTION, SOLUTION EPIDURAL; INFILTRATION; INTRACAUDAL; PERINEURAL at 09:01

## 2021-01-18 RX ADMIN — PROPOFOL 170 MG: 10 INJECTION, EMULSION INTRAVENOUS at 09:01

## 2021-01-18 RX ADMIN — KETAMINE HYDROCHLORIDE 10 MG: 10 INJECTION, SOLUTION INTRAMUSCULAR; INTRAVENOUS at 09:22

## 2021-01-18 RX ADMIN — Medication 2 G: at 09:06

## 2021-01-18 RX ADMIN — ONDANSETRON HYDROCHLORIDE 4 MG: 2 INJECTION INTRAMUSCULAR; INTRAVENOUS at 10:01

## 2021-01-18 RX ADMIN — KETAMINE HYDROCHLORIDE 10 MG: 10 INJECTION, SOLUTION INTRAMUSCULAR; INTRAVENOUS at 09:14

## 2021-01-18 RX ADMIN — PROPOFOL 20 MG: 10 INJECTION, EMULSION INTRAVENOUS at 09:27

## 2021-01-18 RX ADMIN — TRANEXAMIC ACID 1950 MG: 650 TABLET ORAL at 07:42

## 2021-01-18 RX ADMIN — KETAMINE HYDROCHLORIDE 10 MG: 10 INJECTION, SOLUTION INTRAMUSCULAR; INTRAVENOUS at 09:18

## 2021-01-18 RX ADMIN — HYDROMORPHONE HYDROCHLORIDE 0.5 MG: 1 INJECTION, SOLUTION INTRAMUSCULAR; INTRAVENOUS; SUBCUTANEOUS at 09:09

## 2021-01-18 RX ADMIN — ACETAMINOPHEN 1000 MG: 500 TABLET ORAL at 07:42

## 2021-01-18 RX ADMIN — SODIUM CHLORIDE, SODIUM LACTATE, POTASSIUM CHLORIDE, AND CALCIUM CHLORIDE 1000 ML: 600; 310; 30; 20 INJECTION, SOLUTION INTRAVENOUS at 07:39

## 2021-01-18 RX ADMIN — INSULIN LISPRO 2 UNITS: 100 INJECTION, SOLUTION INTRAVENOUS; SUBCUTANEOUS at 10:55

## 2021-01-18 RX ADMIN — KETAMINE HYDROCHLORIDE 20 MG: 10 INJECTION, SOLUTION INTRAMUSCULAR; INTRAVENOUS at 09:08

## 2021-01-18 RX ADMIN — DEXMEDETOMIDINE HYDROCHLORIDE 4 MCG: 100 INJECTION, SOLUTION INTRAVENOUS at 09:13

## 2021-01-18 RX ADMIN — DEXAMETHASONE SODIUM PHOSPHATE 4 MG: 4 INJECTION, SOLUTION INTRAMUSCULAR; INTRAVENOUS at 07:43

## 2021-01-18 RX ADMIN — DEXMEDETOMIDINE HYDROCHLORIDE 4 MCG: 100 INJECTION, SOLUTION INTRAVENOUS at 09:33

## 2021-01-18 RX ADMIN — CELECOXIB 200 MG: 100 CAPSULE ORAL at 07:42

## 2021-01-18 RX ADMIN — DEXMEDETOMIDINE HYDROCHLORIDE 4 MCG: 100 INJECTION, SOLUTION INTRAVENOUS at 09:08

## 2021-01-18 RX ADMIN — SODIUM CHLORIDE, SODIUM LACTATE, POTASSIUM CHLORIDE, AND CALCIUM CHLORIDE: 600; 310; 30; 20 INJECTION, SOLUTION INTRAVENOUS at 09:58

## 2021-01-18 RX ADMIN — SODIUM CHLORIDE, SODIUM LACTATE, POTASSIUM CHLORIDE, AND CALCIUM CHLORIDE 125 ML/HR: 600; 310; 30; 20 INJECTION, SOLUTION INTRAVENOUS at 07:39

## 2021-01-18 NOTE — PERIOP NOTES
Pt. Used restroom in pre-op area with assistance. Patient placed on Kris Paws for a minimum of 30 min in  Preop.

## 2021-01-18 NOTE — INTERVAL H&P NOTE
Update History & Physical 
 
The Patient's History and Physical of January 17,2021,  
  was reviewed with the patient and I examined the patient. There was no change. The surgical site was confirmed by the patient and me. Plan:  The risk, benefits, expected outcome, and alternative to the recommended procedure have been discussed with the patient. Patient understands and wants to proceed with the procedure.  
 
Electronically signed by Jessica Ray MD on 1/18/2021 at 7:21 AM

## 2021-01-18 NOTE — PROGRESS NOTES
1130- Same Day Discharge     1130- Patient arrives to unit at this time. Dual skin assessment completed with Rivera Portillo RN no abnormalities noted other than surgical incision to right hip. Patient is A/O x 4, able to transfer to recliner. Lungs clear, radial pulses present , pedal pulses present , abdomen soft and non-distended. Bowel sounds active, 18 G IV to LFA,  intact and patent infusing. No signs of phlebitis or infiltration noted. TEDS bilaterally. Skin warm and dry  with mepilex dressing to right hip CDI. Patient oriented to room to include use of call bell, meal ordering, and use of incentive spirometer, patient able to get IS to 2500. Patient instructed to order lunch and given explanation of home for dinner plan. Phone and call bell left within reach. Educated on pain medication availability and possible side effects, as well as need to call for assistance before getting out of bed. Patient verbalized understanding.       Symptoms present on arrival:  [] Pain 0/10   [] Medicated  [] Nausea   [] Medicated   [] Hypotension/Hypertension   [] Provider notified

## 2021-01-18 NOTE — PERIOP NOTES
TRANSFER - IN REPORT:    Verbal report received from PONCE LABOY on Conception Song  being received from OR for routine progression of care      Report consisted of patients Situation, Background, Assessment and   Recommendations(SBAR). Information from the following report(s) OR Summary, Procedure Summary, Intake/Output and MAR was reviewed with the receiving nurse. Opportunity for questions and clarification was provided. Assessment completed upon patients arrival to unit and care assumed.

## 2021-01-18 NOTE — DISCHARGE SUMMARY
[address] [address]     DISCHARGE SUMMARY     PATIENT: Eber Hernandez     MRN: [id number]   ADMIT DATE: 2021   BILLIN   DISCHARGE DATE: 2021     ATTENDING: Simona Wood MD   DICTATING: ADINA Reynoso     ADMISSION DIAGNOSIS: Osteoarthritis of right hip [M16.11]    DISCHARGE DIAGNOSIS: Status post RIGHT TOTAL HIP ARTHROPLASTY    HISTORY OF PRESENT ILLNESS: The patient is a 81y.o. year-old male   with ongoing right hip pain secondary to osteoarthritis of right hip. The patient's pain has persisted and progressed despite conservative treatments and therapies. The patient has at this time opted for surgical intervention. PAST MEDICAL HISTORY:   Past Medical History:   Diagnosis Date    Anticoagulated     Arthritis     Basal cell carcinoma     Left ear    Diabetes type 2, controlled ([address]     Hematuria     worked up   Laurel Carson Hyperlipidemia     Hypertension     Kidney stone 2019    Osteoarthritis of right hip 2021    Prostate enlargement 2005    Protein C deficiency ([address]     Thrombophlebitis of left leg ([address]        PAST SURGICAL HISTORY:   Past Surgical History:   Procedure Laterality Date    HX KNEE ARTHROSCOPY Left 2017    left 3x    HX OTHER SURGICAL      ankle surgery-right    HX SHOULDER ARTHROSCOPY      left    HX URETEROLITHOTOMY      kidney stone    HX UROLOGICAL  2006    prostate biopsy     IR IVC FILTER  2006    green filter       ALLERGIES:   Allergies   Allergen Reactions    Contrast Agent [Iodine] Rash     ** able to use topical betadine wash w/out issues.  Penicillins Itching        CURRENT MEDICATIONS:  A list of medications prior to the time of admission include:  Prior to Admission medications    Medication Sig Start Date End Date Taking?  Authorizing Provider   oxyCODONE IR (ROXICODONE) 5 mg immediate release tablet Take 1 Tab by mouth every four (4) hours as needed for Pain for up to 7 days. Max Daily Amount: 30 mg. 1/18/21 1/25/21 Yes Eboni العلي PA   cefadroxil (DURICEF) 500 mg capsule Take 1 Cap by mouth two (2) times a day for 5 days. 1/18/21 1/23/21 Yes Eboni العلي PA   apixaban (Eliquis) 2.5 mg tablet Take 1 Tab by mouth two (2) times a day for 5 days. 1/18/21 1/23/21 Yes Eboni العلي PA   polyethylene glycol (MIRALAX) 17 gram/dose powder Take 17 g by mouth. 1/31/20  Yes Provider, Historical   lisinopril (PRINIVIL, ZESTRIL) 10 mg tablet Take 10 mg by mouth daily. 5/29/12  Yes Provider, Historical   metFORMIN (GLUCOPHAGE) 500 mg tablet Take 500 mg by mouth daily (with breakfast). 5/29/12  Yes Provider, Historical   simvastatin (ZOCOR) 20 mg tablet Take 1 Tab by mouth nightly. 11/30/16  Yes Provider, Historical   empagliflozin (JARDIANCE) 10 mg tablet Take 10 mg by mouth daily. Yes Provider, Historical   ascorbic acid, vitamin C, (Vitamin C) 250 mg tablet Take 500 mg by mouth. Provider, Historical   tamsulosin (FLOMAX) 0.4 mg capsule Take 1 Cap by mouth daily (after dinner).  Indications: stones in the urinary tract 1/27/20   Ant Moran MD       FAMILY HISTORY:   Family History   Problem Relation Age of Onset    Hypertension Mother     Other Mother         sudden death ; smoker    Hypertension Father     Elevated Lipids Father     Other Father         smoker    Heart Disease Sister         myocardial infarction    COPD Sister     Stomach Cancer Brother     Other Brother         peptic ulcer       SOCIAL HISTORY:   Social History     Socioeconomic History    Marital status:      Spouse name: Not on file    Number of children: Not on file    Years of education: Not on file    Highest education level: Not on file   Tobacco Use    Smoking status: Never Smoker    Smokeless tobacco: Never Used   Substance and Sexual Activity    Alcohol use: Not Currently    Drug use: Never       REVIEW OF SYSTEMS: All review of systems are negative. PHYSICAL EXAMINATION: For a detailed physical exam, please refer to the patient's chart. HOSPITAL COURSE: The patient was taken to surgery the day of admission. he underwent right total hip replacement via the anterior approach. Operative course was benign. Estimated blood loss approximately 300 cc. The patient was taken to the PACU in stable condition and was later taken to the floor in stable condition. Post-op Day #0, patient has done very well.  he has had little to no pain. he had been cleared by physical therapy with stair training. he was placed on Eliquis for DVT prophylaxis. his vitals have remained stable. he has also remained hemodynamically stable. The patient has been recommended for discharge home. DISCHARGE INSTRUCTIONS: The patient is to be discharged home. Discharge Medication List as of 1/18/2021 11:47 AM      START taking these medications    Details   oxyCODONE IR (ROXICODONE) 5 mg immediate release tablet Take 1 Tab by mouth every four (4) hours as needed for Pain for up to 7 days. Max Daily Amount: 30 mg., Normal, Disp-42 Tab, R-0      cefadroxil (DURICEF) 500 mg capsule Take 1 Cap by mouth two (2) times a day for 5 days. , Normal, Disp-10 Cap, R-0         CONTINUE these medications which have CHANGED    Details   apixaban (Eliquis) 2.5 mg tablet Take 1 Tab by mouth two (2) times a day for 14 days. , Normal, Disp-28 Tab, R-0         CONTINUE these medications which have NOT CHANGED    Details   ascorbic acid, vitamin C, (Vitamin C) 250 mg tablet Take 500 mg by mouth., Historical Med      polyethylene glycol (MIRALAX) 17 gram/dose powder Take 17 g by mouth., Historical Med      tamsulosin (FLOMAX) 0.4 mg capsule Take 1 Cap by mouth daily (after dinner). Indications: stones in the urinary tract, Print, Disp-30 Cap, R-0      lisinopril (PRINIVIL, ZESTRIL) 10 mg tablet Take 10 mg by mouth daily. , Historical Med      metFORMIN (GLUCOPHAGE) 500 mg tablet Take 500 mg by mouth daily (with breakfast). , Historical Med      simvastatin (ZOCOR) 20 mg tablet Take 1 Tab by mouth nightly., Historical Med      empagliflozin (JARDIANCE) 10 mg tablet Take 10 mg by mouth daily. , Historical Med         STOP taking these medications       vit C/E/Zn/coppr/lutein/zeaxan (PRESERVISION AREDS-2 PO) Comments:   Reason for Stopping:         acetaminophen (TylenoL) 325 mg tablet Comments:   Reason for Stopping:                 The patient is to continue at home with home physical therapy 3 times a week to work on gait training, range of motion, strengthening, and weightbearing exercises as tolerated on his right lower extremity. The patient is to progress from a walker to a cane to complete total weightbearing as tolerable. The patient is to continue to keep his incision dry. [facility]or [facility] PA-C in the office approximately 10-14 days status post for x-rays and further evaluation.       ADINA Barrera  1/19/2021

## 2021-01-18 NOTE — PROGRESS NOTES
Problem: Self Care Deficits Care Plan (Adult)  Goal: *Acute Goals and Plan of Care (Insert Text)  Description: Initial Occupational Therapy Goals (1/18/2021) Within 7 day(s):    1. Patient will perform grooming standing sinkside with supervision for increased independence with ADLs. 2. Patient will perform LB dressing with supervision & A/E PRN for increased independence with ADLs. 3. Patient will perform toilet transfer with supervision for increased independence with ADLs. 4. Patient will perform all aspects of toileting with supervision for increased independence with ADLs. 5. Patient will independently apply energy conservation techniques with 1 verbal cue(s)for increased independence with ADLs. 6. Patient will perform bathroom mobility with supervision for increased independence/safety with ADLs. Outcome: Progressing Towards Goal   OCCUPATIONAL THERAPY EVALUATION    Patient: Liseth Pineda (23 y.o. male)  Date: 1/18/2021  Primary Diagnosis: Osteoarthritis of right hip [M16.11]  Procedure(s) (LRB):  RIGHT HIP: TOTAL HIP REPLACEMENT ANTERIOR APPROACH WITH C-ARM (Right) Day of Surgery   Precautions: Fall, WBAT  PLOF: pt grossly mod I for ADLs/functional mobility, spouse would assist with lower body dressing PRN    ASSESSMENT AND RECOMMENDATIONS:  Based on the objective data described below, the patient presents with RLE decreased ROM and strength affecting LE ADLs. Pt found seated in recliner chair, vitals assessed and WNL, pt reporting pain 0/10. Pt also has limited ROM in L knee. Pt completed upper body dressing with supervision seated in chair. Pt unable to complete sock donning without assist; provided/educated on use of sock aid to increase independence. Pt able to don B socks with min A using A/E, pt owns reacher at home. Pt able to thread B feet through underwear/pants with min A, and CGA when standing to pull up to waist. Pt CGA for STS/bathroom mobility with vc for proper use of RW.  Pt ambulated back to recliner, ice applied to R hip. Provided opportunity for pt to voice questions on ADL performance when home, pt has no further concerns. Patient will benefit from skilled Occupational Therapy intervention to maximize safety/independence with ADLs at d/c.    Education: Reviewed home safety, body mechanics, importance of moving every hour to prevent joint stiffness, role of ice for edema/pain control, Rolling Walker management/safety, and adaptive dressing techniques with patient verbalizing  understanding at this time     Patient will benefit from skilled intervention to address the above impairments. Patient's rehabilitation potential is considered to be Good  Factors which may influence rehabilitation potential include:   [x]             None noted  []             Mental ability/status  []             Medical condition  []             Home/family situation and support systems  []             Safety awareness  []             Pain tolerance/management  []             Other:        PLAN :  Recommendations and Planned Interventions:   [x]               Self Care Training                  [x]      Therapeutic Activities  [x]               Functional Mobility Training   []      Cognitive Retraining  []               Therapeutic Exercises           []      Endurance Activities  []               Balance Training                    []      Neuromuscular Re-Education  []               Visual/Perceptual Training     [x]      Home Safety Training  [x]               Patient Education                   [x]      Family Training/Education  []               Other (comment):    Frequency/Duration: Patient will be followed by Occupational Therapy 1-2 times per day/4-7 days per week to address goals. Discharge Recommendations: Home health with adult supervision at least 24 hours after d/c  Further Equipment Recommendations for Discharge: N/A     SUBJECTIVE:   Patient stated i'm not really having any pain.     OBJECTIVE DATA SUMMARY:     Past Medical History:   Diagnosis Date    Anticoagulated     Arthritis     Basal cell carcinoma 2012    Left ear    Diabetes type 2, controlled ([facility] [address]     Hematuria 2011    worked up    Hyperlipidemia     Hypertension     Kidney stone 2019    Osteoarthritis of right hip 1/17/2021    Prostate enlargement 2005    Protein C deficiency ([facility] [address]     Thrombophlebitis of left leg ([facility] [address] 1999     Past Surgical History:   Procedure Laterality Date    HX KNEE ARTHROSCOPY Left 2017    left 3x    HX OTHER SURGICAL      ankle surgery-right    HX SHOULDER ARTHROSCOPY      left    HX URETEROLITHOTOMY  2021    kidney stone    HX UROLOGICAL  2006    prostate biopsy     IR IVC FILTER  2006    green filter     Barriers to Learning/Limitations: yes;  physical and altered mental status (i.e.Sedation, Confusion)  Compensate with: visual, verbal, tactile, kinesthetic cues/model    Home Situation/Prior Level of Function: pt mod I for ADLs/functional mobility  Home Situation  Home Environment: Private residence  # Steps to Enter: 3  Rails to Enter: Yes  Hand Rails : Bilateral(wide)  One/Two Story Residence: Two story(moved bed to downstairs)  # of Interior Steps: 13  Interior Rails: Right  Lift Chair Available: No  Living Alone: No  Support Systems: Spouse/Significant Other/Partner, Family member(s)  Patient Expects to be Discharged to[de-identified] Private residence  Current DME Used/Available at Home: Izell Sarks, rolling, Cane, straight, Raised toilet seat  Tub or Shower Type: Tub/Shower combination  []  Right hand dominant   []  Left hand dominant    Cognitive/Behavioral Status:  Neurologic State: Alert;Drowsy  Orientation Level: Oriented to person;Oriented to place;Oriented to situation  Cognition: Follows commands  Safety/Judgement: Awareness of environment    Skin: R hip incision w/ Mepilex   Edema: compression hose in place & applied ice     Coordination: BUE  Coordination: Generally decreased, functional  Fine Motor Skills-Upper: Left Intact; Right Intact    Gross Motor Skills-Upper: Left Intact; Right Intact    Balance:  Sitting: Intact  Standing: Intact; With support    Strength: BUE  Strength: Generally decreased, functional    Tone & Sensation:BUE  Tone: Normal  Sensation: Impaired(R hip)    Range of Motion: BUE  AROM: Generally decreased, functional    Functional Mobility and Transfers for ADLs:  Bed Mobility:  Scooting: Stand-by assistance    Transfers:  Sit to Stand: Contact guard assistance(vc)    ADL Assessment:  Feeding: Modified independent    Oral Facial Hygiene/Grooming: Contact guard assistance    Bathing: Minimum assistance    Upper Body Dressing: Supervision    Lower Body Dressing: Minimum assistance    Toileting: Contact guard assistance    ADL Intervention:  Upper Body Dressing Assistance  Dressing Assistance: Supervision  Pullover Shirt: Supervision    Lower Body Dressing Assistance  Dressing Assistance: Minimum assistance  Underpants: Contact guard assistance  Pants With Button/Zipper: Minimum assistance  Socks: Minimum assistance  Leg Crossed Method Used: No  Position Performed: Seated in chair  Cues: Verbal cues provided;Visual cues provided  Adaptive Equipment Used: Sock aid    Cognitive Retraining  Safety/Judgement: Awareness of environment    Pain:  Pain level pre-treatment: 4/10  Pain level post-treatment: 4/10  Pain Intervention(s): Medication administer by Nursing (see MAR); Rest, Ice, Repositioning   Response to intervention: Nurse notified, see doc flow     Activity Tolerance:   Fair. Patient able to stand ~5 minute(s). Patient able to complete ADLs with intermittent rest breaks. Patient limited by pain, strength, ROM. Patient unsteady. Please refer to the flowsheet for vital signs taken during this treatment.   After treatment:   [x]  Patient left in no apparent distress sitting up in chair  []  Patient sitting on EOB  []  Patient left in no apparent distress in bed  [x]  Call bell left within reach  [x]  Nursing notified  []  Caregiver present  [x]  Ice applied  []  SCD's on while back in bed  [] Bed alarm activated    COMMUNICATION/EDUCATION:   Communication/Collaboration:  [x]       Role of Occupational Therapy in the acute care setting. [x]      Home safety education was provided and the patient/caregiver indicated understanding. [x]      Patient/family have participated as able in goal setting and plan of care. [x]      Patient/family agree to work toward stated goals and plan of care. []      Patient understands intent and goals of therapy, but is neutral about his/her participation. []      Patient is unable to participate in plan of care at this time. Thank you for this referral.  Cari Alfaro OTR/L  Time Calculation: 23 mins    Eval Complexity: History: MEDIUM Complexity : Expanded review of history including physical, cognitive and psychosocial  history ; Examination: LOW Complexity : 1-3 performance deficits relating to physical, cognitive , or psychosocial skils that result in activity limitations and / or participation restrictions ; Decision Making:MEDIUM Complexity : Patient may present with comorbidities that affect occupational performnce.  Miniml to moderate modification of tasks or assistance (eg, physical or verbal ) with assesment(s) is necessary to enable patient to complete evaluation

## 2021-01-18 NOTE — ANESTHESIA PREPROCEDURE EVALUATION
Relevant Problems   RENAL FAILURE   (+) Kidney stone on left side   (+) Nephrolithiasis   (+) Staghorn calculus       Anesthetic History   No history of anesthetic complications            Review of Systems / Medical History  Patient summary reviewed, nursing notes reviewed and pertinent labs reviewed    Pulmonary  Within defined limits                 Neuro/Psych   Within defined limits           Cardiovascular    Hypertension              Exercise tolerance: >4 METS     GI/Hepatic/Renal  Within defined limits              Endo/Other    Diabetes    Arthritis     Other Findings            Physical Exam    Airway  Mallampati: III  TM Distance: 4 - 6 cm  Neck ROM: decreased range of motion   Mouth opening: Diminished (comment)     Cardiovascular  Regular rate and rhythm,  S1 and S2 normal,  no murmur, click, rub, or gallop  Rhythm: regular  Rate: normal         Dental    Dentition: Caps/crowns     Pulmonary  Breath sounds clear to auscultation               Abdominal  GI exam deferred       Other Findings            Anesthetic Plan    ASA: 3  Anesthesia type: general            Anesthetic plan and risks discussed with: Patient      GA vs SAB discussed.   Pt prefers GA

## 2021-01-18 NOTE — PROGRESS NOTES
Progression of Symptoms:    [] Pain 0/10   [] Improvement post medication administration   [] No improvement, provider notified   [] Nausea   [] Improvement post medication administration   [] No improvement, provider notified   [] Hypotension/Hypertension   [] Improved post medication administration   [] No improvement, provider notified     Readiness for Discharge:  [x] Vital signs stable  [] + Voiding  [x] Wound intact, drainage minimal  [x] Tolerating PO intake  [x] Cleared by PT (OT if applicable)  [x] Discharge education completed with patient and family member to specifically include   [x] Recommended stool softener  [x] Proper elevation visual demonstration

## 2021-01-18 NOTE — DISCHARGE INSTRUCTIONS
[address] [place] [address] Sports Medicine   Patient Discharge Instructions    Nora Anthony / [id number] : 1935    Admitted 2021 Discharged: 2021     IF YOU HAVE ANY PROBLEMS ONCE YOU ARE AT HOME CALL THE FOLLOWING NUMBERS:   Main office number: [phone]    Your follow up appointment to see either Dr. Ildefonso St [facility], or [facility] as scheduled in 2 weeks. If you are unsure of your appointment date call the office at [phone]. Medication Instructions     · Resume your home medictions as directed, you may have directed not to resume supplements until after your follow up. · A prescription for pain medication has been given   · It is important that you take the medication exactly as they are prescribed. · Keep your medication in the bottles provided by the pharmacist and keep a list of the medication names, dosages, and times to be taken in your wallet. · Do not take other medications without consulting your doctor. What to do at [address] [place] [address] Ave your prehospital diet. If you have excessive nausea or vomitting call your doctor's office. Be sure to maintain adequate fluid intake. Some pain medications may cause constipation. Remember to drink fluids, stay as active as possible, and eat plenty of fiber-rich foods. Begin In-Home Physical Therapy; 3 times a week to work on gait training, range of motion, strengthening, and weight bearing exercises as tolerable. Continue to use your walker or cane when walking. May progress from the walker to a cane to complete total bearing as tolerable. Patient may shower. Wrap incision with plastic wrap/covering to prevent incision from getting wet. Avoid complete immersion. YOUR DRESSING SHOULD BE CHANGED BY YOUR HOME HEALTH NURSE 5-7 AFTER SURGERY ACCORDING TO THE DATE WRITTEN ON YOUR DRESSING.           When to Call    - Call if you have a temperature greater then 101  - Unable to keep food down  - Are unable to bear any wieght   - Need a pain medication refill     Information obtained by :  I understand that if any problems occur once I am at home I am to contact my physician. I understand and acknowledge receipt of the instructions indicated above.                                                                                                                                            Physician's or R.N.'s Signature                                                                  Date/Time                                                                                                                                              Patient or Representative Signature                                                          Date/Time

## 2021-01-18 NOTE — ANESTHESIA POSTPROCEDURE EVALUATION
Post-Anesthesia Evaluation and Assessment    Cardiovascular Function/Vital Signs  Visit Vitals  /64   Pulse 77   Temp 36.7 °C (98.1 °F)   Resp 14   Ht 5' 8\" (1.727 m)   Wt 80.8 kg (178 lb 3 oz)   SpO2 96%   BMI 27.09 kg/m²       Patient is status post Procedure(s):  RIGHT HIP: TOTAL HIP REPLACEMENT ANTERIOR APPROACH WITH C-ARM. Nausea/Vomiting: Controlled. Postoperative hydration reviewed and adequate. Pain:  Pain Scale 1: Numeric (0 - 10) (01/18/21 1058)  Pain Intensity 1: 0 (01/18/21 1058)   Managed. Neurological Status:   Neuro (WDL): Within Defined Limits (01/18/21 1058)   At baseline. Mental Status and Level of Consciousness: Baseline and stable. Pulmonary Status:   O2 Device: Non-rebreather mask (01/18/21 1021)   Adequate oxygenation and airway patent. Complications related to anesthesia: None    Post-anesthesia assessment completed. No concerns. Patient has met all discharge requirements.     Signed By: Bernadette Larsen MD

## 2021-01-18 NOTE — PERIOP NOTES
TRANSFER - OUT REPORT:    Verbal report given to Yara Hernandez RN on Kylie Ramirez  being transferred to  A/ for routine progression of care       Report consisted of patients Situation, Background, Assessment and   Recommendations(SBAR). Information from the following report(s) OR Summary, Procedure Summary, Intake/Output and MAR was reviewed with the receiving nurse. Lines:   Peripheral IV 01/18/21 Left Forearm (Active)   Site Assessment Clean, dry, & intact 01/18/21 1058   Phlebitis Assessment 0 01/18/21 1058   Infiltration Assessment 0 01/18/21 1058   Dressing Status Clean, dry, & intact 01/18/21 1058   Dressing Type Tape;Transparent 01/18/21 1058   Hub Color/Line Status Green; Infusing;Patent 01/18/21 1058        Intake/Output Summary (Last 24 hours) at 1/18/2021 1115  Last data filed at 1/18/2021 1100  Gross per 24 hour   Intake 2310 ml   Output 400 ml   Net 1910 ml       Opportunity for questions and clarification was provided.       Patient transported with:   Registered Nurse

## 2021-01-18 NOTE — PROGRESS NOTES
Problem: Mobility Impaired (Adult and Pediatric)  Goal: *Acute Goals and Plan of Care (Insert Text)  Description: PT goals to be met in 1 day:  Pt will be able to perform supine<>sit SBA for transfers at home. Pt will be able to perform sit<>stand SBA for increased ability to transfer at home safely. Pt will be able to participate in gt training >130' w/ RW, WBAT, GB and CGA/SBA for improved ability in home upon d/c. Pt will be able to perform stair training step to pattern, B/U rail and CGA to obtain safe entry into home upon d/c. Pt will be educated regarding HEP per MD protocol for optimal AROM/strength outcomes. Note: [x]  Patient has met MD mobilization critieria for d/c home   [x]  Recommend HH with 24 hour adult care   []  Benefit from additional acute PT session to address:      PHYSICAL THERAPY EVALUATION    Patient: Leslee Bolivar (47 y.o. male)  Date: 1/18/2021  Primary Diagnosis: Osteoarthritis of right hip [M16.11]  Procedure(s) (LRB):  RIGHT HIP: TOTAL HIP REPLACEMENT ANTERIOR APPROACH WITH C-ARM (Right) Day of Surgery   Precautions:   Fall, WBAT    PLOF: Independent functional mobility w/ use of SPC    ASSESSMENT :  Based on the objective data described below, the patient presents with decreased mobility in regards to bed mobility, transfers, gt quality and tolerance, balance, stair negotiation and safety due to R ABDON surgery. Decreased AROM of R hip/L knee, dec strength of R hip, pain in R hip, dec sensation of R hip also impacting pt functional mobility. Pt rating pain on numerical pain scale pre/post and during session 0/10. Pt ed regarding mobility safety, WB, HEP, ice application/use, elevation, environmental safety and home safe techniques. Pt sitting in recliner upon arrival.Pt able to perform sit<>stand w/ CGA/SBA. Safety vc required throughout session to reinforce safety. Pt able to participate in gt training using RW, GB, WBAT and CGA w/ antalgic gt pattern.   Pt was able to participate in stair training using step to pattern, B rails and CGA.  Pt required to lead w/ R LE due to limitations of L knee ascending steps and L lead w/ descending steps.  Answered questions by pt in regards to PT and mobility.  Pt left sitting in recliner w/ all needs within reach and ice pack to R hip.  Nurse aware of session and outcomes.  Recommend HHPT with responsible adult care at least 24 hours upon hospital d/c.     Patient will benefit from skilled intervention to address the above impairments.  Patient's rehabilitation potential is considered to be Good  Factors which may influence rehabilitation potential include:   []         None noted  []         Mental ability/status  []         Medical condition  []         Home/family situation and support systems  []         Safety awareness  [x]         Pain tolerance/management  []         Other:      PLAN :  Recommendations and Planned Interventions:   [x]           Bed Mobility Training             []    Neuromuscular Re-Education  [x]           Transfer Training                   []    Orthotic/Prosthetic Training  [x]           Gait Training                          [x]    Modalities  [x]           Therapeutic Exercises           [x]    Edema Management/Control  [x]           Therapeutic Activities            [x]    Family Training/Education  [x]           Patient Education  []           Other (comment):    Frequency/Duration: Patient will be followed by physical therapy twice daily to address goals.  Discharge Recommendations: Home Health  Further Equipment Recommendations for Discharge: N/A     SUBJECTIVE:   Patient stated “I have very limited motion of my L knee.”    OBJECTIVE DATA SUMMARY:     Past Medical History:   Diagnosis Date    Anticoagulated     Arthritis     Basal cell carcinoma 2012    Left ear    Diabetes type 2, controlled (HCC)     Hematuria 2011    worked up    Hyperlipidemia     Hypertension     Kidney stone 2019    Osteoarthritis of  right hip 1/17/2021    Prostate enlargement 2005    Protein C deficiency (HCC)     Thrombophlebitis of left leg ([address] 1999     Past Surgical History:   Procedure Laterality Date    HX KNEE ARTHROSCOPY Left 2017    left 3x    HX OTHER SURGICAL      ankle surgery-right    HX SHOULDER ARTHROSCOPY      left    HX URETEROLITHOTOMY  2021    kidney stone    HX UROLOGICAL  2006    prostate biopsy     IR IVC FILTER  2006    green filter     Barriers to Learning/Limitations: yes;  anesthesia  Compensate with: Visual Cues, Verbal Cues, and Tactile Cues  Home Situation:  Home Situation  Home Environment: Private residence  # Steps to Enter: 3  Rails to Enter: Yes  Hand Rails : Bilateral(wide)  One/Two Story Residence: Two story(moved bed to downstairs)  # of Interior Steps: 13  Interior Rails: Right  Lift Chair Available: No  Living Alone: No  Support Systems: Family member(s), Spouse/Significant Other/Partner  Patient Expects to be Discharged to[de-identified] Private residence  Current DME Used/Available at Home: [address], [place], straight, Walker, rolling  Critical Behavior:  Neurologic State: Alert;Drowsy  Orientation Level: Oriented to person;Oriented to place;Oriented to situation  Cognition: Follows commands  Safety/Judgement: Awareness of environment  Psychosocial  Patient Behaviors: Calm; Cooperative  Purposeful Interaction: Yes  Pt Identified Daily Priority: Clinical issues (comment)  Caritas Process: Nurture loving kindness  Caring Interventions: Reassure; Therapeutic modalities  Reassure: Therapeutic listening; Informing  Therapeutic Modalities: Deep breathing  Skin Condition/Temp: Dry;Warm  Skin Integrity: Incision (comment)(R hip)  Skin Integumentary  Skin Color: Appropriate for ethnicity  Skin Condition/Temp: Dry;Warm  Skin Integrity: Incision (comment)(R hip)  Strength:    Strength: Generally decreased, functional  Tone & Sensation:   Tone: Normal  Sensation: Impaired(R hip)  Range Of Motion:  AROM: Generally decreased, functional(limited L knee)  Functional Mobility:  Bed Mobility:  Scooting: Stand-by assistance  Transfers:  Sit to Stand: Contact guard assistance(vc)  Stand to Sit: Contact guard assistance;Stand-by assistance(vc)  Balance:   Sitting: Intact  Standing: Intact; With support  Ambulation/Gait Training:  Distance (ft): 130 Feet (ft)  Assistive Device: Walker, rolling;Gait belt  Ambulation - Level of Assistance: Contact guard assistance(vc)  Gait Abnormalities: Antalgic;Decreased step clearance; Step to gait  Right Side Weight Bearing: As tolerated  Base of Support: Shift to left  Stance: Right decreased  Speed/Sima: Slow  Step Length: Left shortened;Right shortened  Swing Pattern: Left asymmetrical;Right asymmetrical  Interventions: Safety awareness training; Tactile cues; Verbal cues; Visual/Demos  Stairs:  Number of Stairs Trained: 5  Stairs - Level of Assistance: Contact guard assistance(vc)  Rail Use: Both     Therapeutic Exercises:   Encouraged HEP  Pain:  Pain level pre-treatment: 0/10   Pain level post-treatment: 0/10   Pain Intervention(s) : Medication (see MAR); Rest, Ice, Repositioning  Response to intervention: Nurse notified, See doc flow    Activity Tolerance:   Fair   Please refer to the flowsheet for vital signs taken during this treatment. After treatment:   [x]         Patient left in no apparent distress sitting up in chair  []         Patient left in no apparent distress in bed  [x]         Call bell left within reach  [x]         Nursing notified  []         Caregiver present  []         Bed alarm activated  []         SCDs applied    COMMUNICATION/EDUCATION:   [x]         Role of Physical Therapy in the acute care setting. [x]         Fall prevention education was provided and the patient/caregiver indicated understanding. [x]         Patient/family have participated as able in goal setting and plan of care. [x]         Patient/family agree to work toward stated goals and plan of care.   []         Patient understands intent and goals of therapy, but is neutral about his/her participation. []         Patient is unable to participate in goal setting/plan of care: ongoing with therapy staff.  []         Other:     Thank you for this referral.  Jade Rob, PT   Time Calculation: 27 mins      Eval Complexity: History: HIGH Complexity :3+ comorbidities / personal factors will impact the outcome/ POC Exam:MEDIUM Complexity : 3 Standardized tests and measures addressing body structure, function, activity limitation and / or participation in recreation  Presentation: LOW Complexity : Stable, uncomplicated  Clinical Decision Making:Low Complexity    Overall Complexity:LOW

## 2021-01-18 NOTE — OP NOTES
[address] [facility] [address]  Total Hip Arthroplasty      Patient: Cristina Escobar MRN: [id number]  SSN: [id number]    YOB: 1935  Age: 81 y.o. Sex: male      Date of Surgery: 1/18/2021   Preoperative Diagnosis: UNILATERAL PRIMARY OSTEOARTHRITIS,RIGHT HIP   Postoperative Diagnosis: UNILATERAL PRIMARY OSTEOARTHRITIS,RIGHT HIP   Location: [facility]  Surgeon: Liang Thomas MD  Assistant: Yvonne Nava PA-C    Anesthesia: general    Procedure: Total Right Hip Arthroplasty    Findings: Degenerative joint disease of the right hip. Estimated Blood Loss: 350ml    Specimens: None    Complications: none    Implants:   Implant Name Type Inv. Item Serial No.  Lot No. LRB No. Used Action   SHELL ACET [id number] 3 H LEGEND - [id number]  SHELL ACET [id number] 3 H LEGEND  ORTHO DEVELOPMENT Gnzo_WD [id number] Right 1 Implanted   LINER ACET OD52MM ID36MM NEUT LEGEND - [id number]  LINER ACET OD52MM ID36MM NEUT LEGEND  ORTHO DEVELOPMENT Gnzo_WD [id number] Right 1 Implanted   SCREW BNE L30MM [id number] CANC LO PROF ACET SYS ESCALADE - [id number]  SCREW BNE L30MM [id number] CANC LO PROF ACET SYS ESCALADE  ORTHO DEVELOPMENT Gnzo_WD [id number] Right 1 Implanted   HEAD FEM [id number] +0MM 12 14 TAPR CERAMIC BIOLOX DELT - [id number]  HEAD FEM [id number] +0MM 12 14 TAPR CERAMIC BIOLOX DELT  ORTHO DEVELOPMENT Gnzo_WD [id number] Right 1 Implanted   STEM FEM SZ 10 HIP CLLR EXTN MOD ENTRADA - [id number]  STEM FEM SZ 10 HIP CLLR EXTN MOD ENTRADA  ORTHO DEVELOPMENT Gnzo_WD [id number] Right 1 Implanted       Procedure Detail:  After the patient was brought to the operating suite, He was effectively anesthetized using general anesthesia, then transferred to the Comer table and secured in a standard fashion. His right hip was then prepped and draped in a normal sterile orthopedic fashion. He was given appropriate intravenous antibiotics preoperatively.  After a proper timeout was performed, a direct anterior approach to the hip was performed using a short Terry-Chun interval. Anterior capsulotomy was performed. The degenerative changes of the hip were noted. Femoral neck osteotomy was then performed to the templated area. The head and neck were removed. The pulvinar and labrum were excised. The acetabulum was then reamed up to 52 mm with good bleeding cancellous bone obtained. The cup was then irrigated with pulse lavage system. A 52 mm orthodevelopment Legend cup was then impacted in place with excellent stable fixation obtained, placing the cup at about 45 degrees of abduction, 20 degrees of anteversion. The liner was then impacted in place. A screw was placed. Attention was turned to the femur, which was delivered into the wound with a combination of extension, external rotation, and adduction, and using the hook on the [place] table to deliver the femur into the wound. The canal was broached up to a size 10 for the Entrada stem system with excellent stable fixation obtained. A trial reduction was then performed with the standard neck offset and 36 mm head balls with various neck lengths. With the +0, he appeared to have equalization of leg lengths and restoration of offset radiographically, and excellent functional stability was noted. The trial broach was removed. The canal was irrigated with the pulse lavage system. The final components were impacted in place with excellent stable fixation obtained once again. The final reduction was performed and once again leg lengths and offset were restored radiographically, using the C-arm radiographically intraoperatively, and excellent functional stability was noted. The wound was then irrigated one more time, and then closed in layers. The fascia of the tensor was closed with #1 Vicryl in a running type stitch. Subcutaneous tissue was closed with 2-0 Vicryl in a simple buried stitch, and the skin was closed with Prineo. Dry, sterile dressing was then applied.  He tolerated this well, was transferred to the bed, and taken to recovery room, extubated, in stable condition. All sponge and needle counts were correct.     Signed By: Doug Saravia MD     January 18, 2021

## 2021-01-19 ENCOUNTER — TELEPHONE (OUTPATIENT)
Dept: OTHER | Age: 86
End: 2021-01-19

## 2021-01-19 ENCOUNTER — HOME CARE VISIT (OUTPATIENT)
Dept: SCHEDULING | Facility: HOME HEALTH | Age: 86
End: 2021-01-19
Payer: MEDICARE

## 2021-01-19 VITALS
DIASTOLIC BLOOD PRESSURE: 65 MMHG | OXYGEN SATURATION: 96 % | SYSTOLIC BLOOD PRESSURE: 129 MMHG | RESPIRATION RATE: 16 BRPM | HEART RATE: 83 BPM | TEMPERATURE: 98.3 F

## 2021-01-19 PROCEDURE — 3331090002 HH PPS REVENUE DEBIT

## 2021-01-19 PROCEDURE — A6213 FOAM DRG >16<=48 SQ IN W/BDR: HCPCS

## 2021-01-19 PROCEDURE — G0151 HHCP-SERV OF PT,EA 15 MIN: HCPCS

## 2021-01-19 PROCEDURE — 400018 HH-NO PAY CLAIM PROCEDURE

## 2021-01-19 PROCEDURE — 3331090001 HH PPS REVENUE CREDIT

## 2021-01-19 PROCEDURE — 400013 HH SOC

## 2021-01-19 NOTE — TELEPHONE ENCOUNTER
Madai Almaguer post total hip replacement follow up call. Home Health coming out today. Discussed using ice, distraction, and repositioning to manage pain besides using medication. Reminded patient not to get the wound wet and to cover it before bathing. Reminded patient the importance of doing exercises as shown. Reminded patient to change positions frequently and walking at least 3-4 times each day to promote circulation, decrease stiffness and soreness. Reinforced swelling, bruising and increased pain is normal after surgery. Instructed patient to lie down in bed and elevate legs on pillows above heart if having swelling to lower extremities. Reminded to healthy eat foods along with drinking plenty of fluids to promote healing. Reminded not  to take medication on an empty stomach to prevent nausea. Reminded to take a stool softener while taking a narcotic due to constipation being a side effect of anesthesia and narcotics. Taking medications as prescribed by provider. Madai Almaguer knows when their follow up appointment is.     Orthopedic Navigator

## 2021-01-20 ENCOUNTER — HOME CARE VISIT (OUTPATIENT)
Dept: SCHEDULING | Facility: HOME HEALTH | Age: 86
End: 2021-01-20
Payer: MEDICARE

## 2021-01-20 VITALS
RESPIRATION RATE: 16 BRPM | DIASTOLIC BLOOD PRESSURE: 62 MMHG | HEART RATE: 94 BPM | OXYGEN SATURATION: 98 % | SYSTOLIC BLOOD PRESSURE: 128 MMHG

## 2021-01-20 PROCEDURE — 3331090002 HH PPS REVENUE DEBIT

## 2021-01-20 PROCEDURE — 3331090001 HH PPS REVENUE CREDIT

## 2021-01-20 PROCEDURE — G0299 HHS/HOSPICE OF RN EA 15 MIN: HCPCS

## 2021-01-21 ENCOUNTER — HOME CARE VISIT (OUTPATIENT)
Dept: SCHEDULING | Facility: HOME HEALTH | Age: 86
End: 2021-01-21
Payer: MEDICARE

## 2021-01-21 ENCOUNTER — HOME CARE VISIT (OUTPATIENT)
Dept: HOME HEALTH SERVICES | Facility: HOME HEALTH | Age: 86
End: 2021-01-21
Payer: MEDICARE

## 2021-01-21 PROCEDURE — 3331090002 HH PPS REVENUE DEBIT

## 2021-01-21 PROCEDURE — 3331090001 HH PPS REVENUE CREDIT

## 2021-01-21 PROCEDURE — G0157 HHC PT ASSISTANT EA 15: HCPCS

## 2021-01-22 ENCOUNTER — HOME CARE VISIT (OUTPATIENT)
Dept: SCHEDULING | Facility: HOME HEALTH | Age: 86
End: 2021-01-22
Payer: MEDICARE

## 2021-01-22 VITALS
TEMPERATURE: 98.5 F | OXYGEN SATURATION: 94 % | HEART RATE: 95 BPM | DIASTOLIC BLOOD PRESSURE: 82 MMHG | SYSTOLIC BLOOD PRESSURE: 103 MMHG

## 2021-01-22 VITALS
HEART RATE: 77 BPM | RESPIRATION RATE: 16 BRPM | OXYGEN SATURATION: 98 % | TEMPERATURE: 99.1 F | DIASTOLIC BLOOD PRESSURE: 68 MMHG | SYSTOLIC BLOOD PRESSURE: 114 MMHG

## 2021-01-22 PROCEDURE — G0151 HHCP-SERV OF PT,EA 15 MIN: HCPCS

## 2021-01-22 PROCEDURE — 3331090002 HH PPS REVENUE DEBIT

## 2021-01-22 PROCEDURE — 3331090001 HH PPS REVENUE CREDIT

## 2021-01-23 PROCEDURE — 3331090002 HH PPS REVENUE DEBIT

## 2021-01-23 PROCEDURE — 3331090001 HH PPS REVENUE CREDIT

## 2021-01-24 PROCEDURE — 3331090001 HH PPS REVENUE CREDIT

## 2021-01-24 PROCEDURE — 3331090002 HH PPS REVENUE DEBIT

## 2021-01-25 ENCOUNTER — HOME CARE VISIT (OUTPATIENT)
Dept: SCHEDULING | Facility: HOME HEALTH | Age: 86
End: 2021-01-25
Payer: MEDICARE

## 2021-01-25 PROCEDURE — G0299 HHS/HOSPICE OF RN EA 15 MIN: HCPCS

## 2021-01-25 PROCEDURE — 3331090002 HH PPS REVENUE DEBIT

## 2021-01-25 PROCEDURE — 3331090001 HH PPS REVENUE CREDIT

## 2021-01-26 ENCOUNTER — HOME CARE VISIT (OUTPATIENT)
Dept: SCHEDULING | Facility: HOME HEALTH | Age: 86
End: 2021-01-26
Payer: MEDICARE

## 2021-01-26 PROCEDURE — 3331090002 HH PPS REVENUE DEBIT

## 2021-01-26 PROCEDURE — G0157 HHC PT ASSISTANT EA 15: HCPCS

## 2021-01-26 PROCEDURE — 3331090001 HH PPS REVENUE CREDIT

## 2021-01-27 ENCOUNTER — HOME CARE VISIT (OUTPATIENT)
Dept: SCHEDULING | Facility: HOME HEALTH | Age: 86
End: 2021-01-27
Payer: MEDICARE

## 2021-01-27 VITALS
HEART RATE: 75 BPM | DIASTOLIC BLOOD PRESSURE: 60 MMHG | TEMPERATURE: 98.1 F | OXYGEN SATURATION: 98 % | RESPIRATION RATE: 16 BRPM | SYSTOLIC BLOOD PRESSURE: 110 MMHG

## 2021-01-27 PROCEDURE — G0157 HHC PT ASSISTANT EA 15: HCPCS

## 2021-01-27 PROCEDURE — 3331090002 HH PPS REVENUE DEBIT

## 2021-01-27 PROCEDURE — 3331090001 HH PPS REVENUE CREDIT

## 2021-01-28 VITALS
SYSTOLIC BLOOD PRESSURE: 107 MMHG | OXYGEN SATURATION: 97 % | HEART RATE: 71 BPM | OXYGEN SATURATION: 94 % | DIASTOLIC BLOOD PRESSURE: 56 MMHG | TEMPERATURE: 97.5 F | DIASTOLIC BLOOD PRESSURE: 54 MMHG | HEART RATE: 71 BPM | TEMPERATURE: 98.4 F | SYSTOLIC BLOOD PRESSURE: 118 MMHG

## 2021-01-28 PROCEDURE — 3331090002 HH PPS REVENUE DEBIT

## 2021-01-28 PROCEDURE — 3331090001 HH PPS REVENUE CREDIT

## 2021-01-29 ENCOUNTER — HOME CARE VISIT (OUTPATIENT)
Dept: SCHEDULING | Facility: HOME HEALTH | Age: 86
End: 2021-01-29
Payer: MEDICARE

## 2021-01-29 PROCEDURE — 3331090002 HH PPS REVENUE DEBIT

## 2021-01-29 PROCEDURE — 3331090001 HH PPS REVENUE CREDIT

## 2021-01-29 PROCEDURE — G0157 HHC PT ASSISTANT EA 15: HCPCS

## 2021-01-30 ENCOUNTER — HOME CARE VISIT (OUTPATIENT)
Dept: SCHEDULING | Facility: HOME HEALTH | Age: 86
End: 2021-01-30
Payer: MEDICARE

## 2021-01-30 PROCEDURE — 3331090001 HH PPS REVENUE CREDIT

## 2021-01-30 PROCEDURE — G0299 HHS/HOSPICE OF RN EA 15 MIN: HCPCS

## 2021-01-30 PROCEDURE — 3331090002 HH PPS REVENUE DEBIT

## 2021-01-31 VITALS
DIASTOLIC BLOOD PRESSURE: 59 MMHG | TEMPERATURE: 98.2 F | OXYGEN SATURATION: 97 % | SYSTOLIC BLOOD PRESSURE: 116 MMHG | HEART RATE: 71 BPM

## 2021-01-31 PROCEDURE — 3331090002 HH PPS REVENUE DEBIT

## 2021-01-31 PROCEDURE — 3331090001 HH PPS REVENUE CREDIT

## 2021-02-01 VITALS
RESPIRATION RATE: 20 BRPM | TEMPERATURE: 98.3 F | SYSTOLIC BLOOD PRESSURE: 124 MMHG | DIASTOLIC BLOOD PRESSURE: 76 MMHG | HEART RATE: 88 BPM | OXYGEN SATURATION: 98 %

## 2021-02-01 PROCEDURE — 3331090002 HH PPS REVENUE DEBIT

## 2021-02-01 PROCEDURE — 3331090001 HH PPS REVENUE CREDIT

## 2021-02-01 NOTE — PROGRESS NOTES
Hi Dr. Burke De Paz,      I just wanted to make you aware that Mr. Waqar Kay has been discharged from home health skilled nursing services due to goals being met. Dressing changed, incision looks good with no signs of infection noted. Thank you for allowing [facility] to care for your patient.     Thank you,  Rosa Cameron RN

## 2021-02-02 ENCOUNTER — HOME CARE VISIT (OUTPATIENT)
Dept: SCHEDULING | Facility: HOME HEALTH | Age: 86
End: 2021-02-02
Payer: MEDICARE

## 2021-02-02 PROCEDURE — G0157 HHC PT ASSISTANT EA 15: HCPCS

## 2021-02-02 PROCEDURE — 3331090001 HH PPS REVENUE CREDIT

## 2021-02-02 PROCEDURE — 3331090002 HH PPS REVENUE DEBIT

## 2021-02-03 ENCOUNTER — HOME CARE VISIT (OUTPATIENT)
Dept: SCHEDULING | Facility: HOME HEALTH | Age: 86
End: 2021-02-03
Payer: MEDICARE

## 2021-02-03 VITALS
DIASTOLIC BLOOD PRESSURE: 52 MMHG | TEMPERATURE: 97.9 F | OXYGEN SATURATION: 96 % | HEART RATE: 76 BPM | SYSTOLIC BLOOD PRESSURE: 111 MMHG

## 2021-02-03 VITALS
TEMPERATURE: 97.2 F | DIASTOLIC BLOOD PRESSURE: 57 MMHG | OXYGEN SATURATION: 98 % | HEART RATE: 81 BPM | SYSTOLIC BLOOD PRESSURE: 102 MMHG | RESPIRATION RATE: 16 BRPM

## 2021-02-03 PROCEDURE — G0151 HHCP-SERV OF PT,EA 15 MIN: HCPCS

## 2021-02-03 PROCEDURE — 3331090002 HH PPS REVENUE DEBIT

## 2021-02-03 PROCEDURE — 3331090001 HH PPS REVENUE CREDIT

## 2021-02-04 PROCEDURE — 3331090001 HH PPS REVENUE CREDIT

## 2021-02-04 PROCEDURE — 3331090002 HH PPS REVENUE DEBIT

## 2021-02-05 PROCEDURE — 3331090001 HH PPS REVENUE CREDIT

## 2021-02-05 PROCEDURE — 3331090002 HH PPS REVENUE DEBIT

## 2021-08-03 PROBLEM — N20.0 NEPHROLITHIASIS: Status: RESOLVED | Noted: 2020-01-28 | Resolved: 2021-08-03

## (undated) DEVICE — TAPE ADH CLTH SILK H2O REPELLENT CURAD

## (undated) DEVICE — ENDOSCOPIC VALVE WITH ADAPTER.: Brand: SURSEAL® II

## (undated) DEVICE — (D)PREP SKN CHLRAPRP APPL 26ML -- CONVERT TO ITEM 371833

## (undated) DEVICE — CHECK-FLO ADAPTER: Brand: CHECK-FLO

## (undated) DEVICE — URETERAL ACCESS SHEATH SET: Brand: NAVIGATOR HD

## (undated) DEVICE — STRIP SKIN CLSR W0.25XL4IN WHT SPUNBOUND FBR NYL HI ADH

## (undated) DEVICE — GDWIRE UROL STR 150CM FLX TP -- BX/5 SENSOR

## (undated) DEVICE — ZIP 16 SURGICAL SKIN CLOSURE DEVICE: Brand: ZIP 16 SURGICAL SKIN CLOSURE DEVICE

## (undated) DEVICE — 3M™ TEGADERM™ TRANSPARENT FILM DRESSING FRAME STYLE, 1629, 8 IN X 12 IN (20 CM X 30 CM), 10/CT 8CT/CASE: Brand: 3M™ TEGADERM™

## (undated) DEVICE — SWABSTICK MEDICATED ADH PREP BDINE TINC BENZ

## (undated) DEVICE — STERILE POLYISOPRENE POWDER-FREE SURGICAL GLOVES: Brand: PROTEXIS

## (undated) DEVICE — Y-TYPE TUR/BLADDER IRRIGATION SET, REGULATING CLAMP

## (undated) DEVICE — Z DUP USE 2565107 PACK SURG PROC LEG CYSTO T-DRAPE REINF TBL CVR HND TWL

## (undated) DEVICE — SYR 20ML LL STRL LF --

## (undated) DEVICE — DRAPE PERC PROC W335XL196CM LINGEMAN

## (undated) DEVICE — SOLUTION IRRIG 3000ML 0.9% SOD CHL FLX CONT 0797208] ICU MEDICAL INC]

## (undated) DEVICE — GDWIRE URET STR STD .035X150 -- ZIPWIRE STD

## (undated) DEVICE — Device: Brand: MEDEX

## (undated) DEVICE — URETERAL ACCESS SHEATH SET: Brand: NAVIGATOR

## (undated) DEVICE — DUAL LUMEN URETERAL CATHETER

## (undated) DEVICE — PERC NCIRCLE, NITINOL TIPLESS STONE EXTRACTOR: Brand: PERC NCIRCLE

## (undated) DEVICE — SOL IRRIGATION INJ NACL 0.9% 500ML BTL

## (undated) DEVICE — GAUZE,SPONGE,4"X4",12PLY,STERILE,LF,2'S: Brand: MEDLINE

## (undated) DEVICE — COVER SURG EQUIP W36XL28IN W/ E BND ARND BTM

## (undated) DEVICE — BAG DRAINAGE CUST DISP

## (undated) DEVICE — TRAY PREP DRY W/ PREM GLV 2 APPL 6 SPNG 2 UNDPD 1 OVERWRAP

## (undated) DEVICE — DRAPE TWL SURG 16X26IN BLU ORB04] ALLCARE INC]

## (undated) DEVICE — ADHESIVE TOP BENZ TINC SWABSTK --

## (undated) DEVICE — LUB SURG MEDC STRL 2OZ TUBE MC -- MEDICHOICE

## (undated) DEVICE — SYR 50ML LR LCK 1ML GRAD NSAF --

## (undated) DEVICE — DRESSING ALG W4XL8IN AG FOAM SUPERABSORBENT SIL ANTIMIC

## (undated) DEVICE — CATH URETH FOL 2W MED 20FRX5 --

## (undated) DEVICE — HANDPIECE SET WITH HIGH FLOW TIP AND SUCTION TUBE: Brand: INTERPULSE

## (undated) DEVICE — X-RAY SPONGES,12 PLY: Brand: DERMACEA

## (undated) DEVICE — TUBING IRRIG L77IN DIA0.241IN L BOR FOR CYSTO W/ NVENT

## (undated) DEVICE — GAUZE,SPONGE,4"X4",16PLY,STRL,LF,10/TRAY: Brand: MEDLINE

## (undated) DEVICE — TABLE COVER: Brand: CONVERTORS

## (undated) DEVICE — GUIDEWIRE VASC L180CM DIA0.038IN L6CM TIP PTFE S STL STR

## (undated) DEVICE — POSITIONER HD REST FOAM CMFRT TCH

## (undated) DEVICE — SINGLE-USE DIGITAL FLEXIBLE URETEROSCOPE: Brand: LITHOVUE

## (undated) DEVICE — GRIPPER

## (undated) DEVICE — FLEXOR, URETERAL ACCESS SHEATH WITH AQ, HYDROPHILIC COATING: Brand: FLEXOR

## (undated) DEVICE — LEGGINGS, PAIR, 31X48, STERILE: Brand: MEDLINE

## (undated) DEVICE — SEAL INSTR CYSTO ADJ BX PRT SEAL FOR ACC UP TO 6FR

## (undated) DEVICE — GARMENT,MEDLINE,DVT,INT,CALF,MED, GEN2: Brand: MEDLINE

## (undated) DEVICE — LIGHT HANDLE: Brand: DEVON

## (undated) DEVICE — DRAPE,REIN 53X77,STERILE: Brand: MEDLINE

## (undated) DEVICE — SOL INJ L R 1000ML BG --

## (undated) DEVICE — DILATOR/SHEATH SET: Brand: 8/10 DILATOR/SHEATH SET

## (undated) DEVICE — SUT VCRL + 1 36IN CT1 VIO --

## (undated) DEVICE — NEEDLE SPNL 20GA L3.5IN YEL HUB S STL REG WALL FIT STYL W/

## (undated) DEVICE — GUIDEWIRE: Brand: AMPLATZ SUPER STIFF™

## (undated) DEVICE — GDWIRE URET ANG BNTSN .035X150 -- ZIPWIRE BENSTON STD

## (undated) DEVICE — DRAINBAG,ANTI-REFLUX TOWER,L/F,2000ML,LL: Brand: MEDLINE

## (undated) DEVICE — THE CANADY HYBRID PLASMA SCALPEL IS AN ELECTROSURGICAL PLASMA SCALPEL THAT USES AN 85MM BENDABLE PADDLE BLADE TIP. THE ELECTROSURGICAL PLASMA SCALPEL IS USED TO SIMULTANEOUSLY CUT AND COAGULATE BIOLOGICAL TISSUE.: Brand: CANADY HYBRID PLASMA PADDLE BLADE

## (undated) DEVICE — BLADE SAW 1.27X13X90 MM FOR LG BNE

## (undated) DEVICE — PACK PROCEDURE SURG TOT HIP ANTR CARTER CUST

## (undated) DEVICE — SUT VCRL + 2-0 36IN CT1 UD --

## (undated) DEVICE — GDWIRE URET STR STD .038X150 -- ZIPWIRE STD

## (undated) DEVICE — KIT PRB L440MM DIA3.9MM BLU SWISS LITHOCLAST TRIL

## (undated) DEVICE — GOWN,REINFORCED,POLY,AURORA,XLARGE,STRL: Brand: MEDLINE

## (undated) DEVICE — 4-PORT MANIFOLD: Brand: NEPTUNE 2

## (undated) DEVICE — KIT CLN UP BON SECOURS MARYV

## (undated) DEVICE — MEDI-VAC NON-CONDUCTIVE SUCTION TUBING: Brand: CARDINAL HEALTH

## (undated) DEVICE — HIGH PRESSURE NEPHROSTOMY BALLOON CATHETER KIT: Brand: NEPHROMAX KIT

## (undated) DEVICE — SYR 10ML LUER LOK 1/5ML GRAD --

## (undated) DEVICE — SOLUTION IV 1000ML 0.9% SOD CHL

## (undated) DEVICE — APPLICATOR BNDG 1MM ADH PREMIERPRO EXOFIN

## (undated) DEVICE — CATH URET 5FRX70CM W/OPN END -- BX/20

## (undated) DEVICE — SYR LR LCK 1ML GRAD NSAF 30ML --